# Patient Record
Sex: FEMALE | Race: WHITE | NOT HISPANIC OR LATINO | Employment: FULL TIME | URBAN - METROPOLITAN AREA
[De-identification: names, ages, dates, MRNs, and addresses within clinical notes are randomized per-mention and may not be internally consistent; named-entity substitution may affect disease eponyms.]

---

## 2019-11-04 ENCOUNTER — HOSPITAL ENCOUNTER (EMERGENCY)
Facility: HOSPITAL | Age: 19
Discharge: HOME | End: 2019-11-04
Attending: EMERGENCY MEDICINE | Admitting: EMERGENCY MEDICINE

## 2019-11-04 VITALS
OXYGEN SATURATION: 100 % | TEMPERATURE: 98.1 F | DIASTOLIC BLOOD PRESSURE: 99 MMHG | WEIGHT: 117.73 LBS | SYSTOLIC BLOOD PRESSURE: 139 MMHG | HEART RATE: 114 BPM | RESPIRATION RATE: 16 BRPM

## 2019-11-04 DIAGNOSIS — G51.0 BELL'S PALSY: Primary | ICD-10-CM

## 2019-11-04 PROCEDURE — 99284 EMERGENCY DEPT VISIT MOD MDM: CPT

## 2019-11-04 PROCEDURE — 99284 EMERGENCY DEPT VISIT MOD MDM: CPT | Performed by: EMERGENCY MEDICINE

## 2019-11-04 PROCEDURE — 86618 LYME DISEASE ANTIBODY: CPT | Performed by: EMERGENCY MEDICINE

## 2019-11-04 PROCEDURE — 36415 COLL VENOUS BLD VENIPUNCTURE: CPT | Performed by: EMERGENCY MEDICINE

## 2019-11-04 RX ORDER — ACYCLOVIR 200 MG/1
400 CAPSULE ORAL ONCE
Status: DISCONTINUED | OUTPATIENT
Start: 2019-11-04 | End: 2019-11-04

## 2019-11-04 RX ORDER — NORETHINDRONE ACETATE AND ETHINYL ESTRADIOL 1MG-20(21)
1 KIT ORAL DAILY
COMMUNITY
End: 2021-07-09

## 2019-11-04 RX ORDER — VALACYCLOVIR HYDROCHLORIDE 500 MG/1
1000 TABLET, FILM COATED ORAL ONCE
Status: COMPLETED | OUTPATIENT
Start: 2019-11-04 | End: 2019-11-04

## 2019-11-04 RX ORDER — PREDNISONE 20 MG/1
TABLET ORAL
Qty: 15 TABLET | Refills: 0 | Status: SHIPPED | OUTPATIENT
Start: 2019-11-04 | End: 2019-11-16

## 2019-11-04 RX ORDER — VALACYCLOVIR HYDROCHLORIDE 500 MG/1
1000 TABLET, FILM COATED ORAL EVERY 8 HOURS SCHEDULED
Status: DISCONTINUED | OUTPATIENT
Start: 2019-11-04 | End: 2019-11-04

## 2019-11-04 RX ORDER — PREDNISONE 20 MG/1
60 TABLET ORAL DAILY
Qty: 15 TABLET | Refills: 0 | Status: SHIPPED | OUTPATIENT
Start: 2019-11-04 | End: 2019-11-11

## 2019-11-04 RX ORDER — DEXTROAMPHETAMINE SACCHARATE, AMPHETAMINE ASPARTATE, DEXTROAMPHETAMINE SULFATE AND AMPHETAMINE SULFATE 3.75; 3.75; 3.75; 3.75 MG/1; MG/1; MG/1; MG/1
15 TABLET ORAL DAILY
COMMUNITY

## 2019-11-04 RX ORDER — DULOXETIN HYDROCHLORIDE 60 MG/1
60 CAPSULE, DELAYED RELEASE ORAL 2 TIMES DAILY
COMMUNITY
End: 2021-07-09

## 2019-11-04 RX ADMIN — VALACYCLOVIR HYDROCHLORIDE 1000 MG: 500 TABLET, FILM COATED ORAL at 17:05

## 2019-11-04 RX ADMIN — PREDNISONE 50 MG: 10 TABLET ORAL at 16:46

## 2019-11-04 NOTE — ED PROVIDER NOTES
History  Chief Complaint   Patient presents with    Facial Numbness     R sided facial numbness, droop since Saturday  Denies extremity involvement  Recent URI  No prior history of similar  Patient is a 76-year-old female who presents for right-sided facial numbness and droop  Patient says the symptoms started on Saturday  She says that since Wednesday she has had sinus congestion productive of yellowish mucus  She says that starting Saturday she noticed she had a weird sensation the right side of her face and difficulty raising her eyebrows on closing her eyes  Patient says she has never had this with sinus infections in the past   Patient says she had Lyme disease about 6 years ago but was treated with doxycycline at that time  She denies any new rashes, or spend any time in the woods recently  She denies ear pain, ear drainage, tinnitus, sore throat, neck pain, headache, numbness or tingling or weakness in her arms or legs  Prior to Admission Medications   Prescriptions Last Dose Informant Patient Reported? Taking? DULoxetine (CYMBALTA) 60 mg delayed release capsule 11/4/2019 at Unknown time Self Yes Yes   Sig: Take 60 mg by mouth 2 (two) times a day   amphetamine-dextroamphetamine (ADDERALL) 15 MG tablet 11/4/2019 at Unknown time Self Yes Yes   Sig: Take 15 mg by mouth 2 (two) times a day   norethindrone-ethinyl estradiol (JUNEL FE 1/20) 1-20 MG-MCG per tablet 11/4/2019 at Unknown time Self Yes Yes   Sig: Take 1 tablet by mouth daily      Facility-Administered Medications: None       Past Medical History:   Diagnosis Date    Asthma     Lyme disease        Past Surgical History:   Procedure Laterality Date    NASAL SINUS SURGERY      UPPER GASTROINTESTINAL ENDOSCOPY         History reviewed  No pertinent family history  I have reviewed and agree with the history as documented      Social History     Tobacco Use    Smoking status: Never Smoker    Smokeless tobacco: Never Used Substance Use Topics    Alcohol use: Yes     Comment: socially     Drug use: Never        Review of Systems   Constitutional: Negative for chills, diaphoresis and fever  HENT: Positive for congestion  Negative for sinus pressure, sore throat and trouble swallowing  Eyes: Negative for pain, discharge and itching  Respiratory: Negative for cough, chest tightness, shortness of breath and wheezing  Cardiovascular: Negative for chest pain, palpitations and leg swelling  Gastrointestinal: Negative for abdominal distention, abdominal pain, blood in stool, diarrhea, nausea and vomiting  Endocrine: Negative for polyphagia and polyuria  Genitourinary: Negative for difficulty urinating, dysuria, flank pain, hematuria, pelvic pain and vaginal bleeding  Musculoskeletal: Negative for arthralgias and back pain  Skin: Negative for rash  Neurological: Positive for facial asymmetry  Negative for dizziness, syncope, weakness, light-headedness and headaches  Physical Exam  ED Triage Vitals [11/04/19 1558]   Temperature Pulse Respirations Blood Pressure SpO2   98 1 °F (36 7 °C) (!) 114 16 139/99 100 %      Temp Source Heart Rate Source Patient Position - Orthostatic VS BP Location FiO2 (%)   Temporal Monitor Sitting Right arm --      Pain Score       No Pain             Orthostatic Vital Signs  Vitals:    11/04/19 1558   BP: 139/99   Pulse: (!) 114   Patient Position - Orthostatic VS: Sitting       Physical Exam   Constitutional: She is oriented to person, place, and time  She appears well-developed and well-nourished  No distress  HENT:   Head: Normocephalic and atraumatic  Right Ear: External ear normal    Left Ear: External ear normal    Mouth/Throat: Oropharynx is clear and moist  No oropharyngeal exudate  Eyes: Pupils are equal, round, and reactive to light  Conjunctivae and EOM are normal    Inability to completely close right eyelid  No conjunctival injection   Neck: Normal range of motion  Neck supple  Cardiovascular: Normal rate, regular rhythm, normal heart sounds and intact distal pulses  Exam reveals no gallop and no friction rub  No murmur heard  Pulmonary/Chest: Effort normal and breath sounds normal  No respiratory distress  She has no wheezes  She has no rales  Abdominal: Soft  She exhibits no distension  There is no tenderness  There is no guarding  Musculoskeletal: Normal range of motion  She exhibits no edema, tenderness or deformity  Lymphadenopathy:     She has no cervical adenopathy  Neurological: She is alert and oriented to person, place, and time  A cranial nerve deficit is present  No sensory deficit  She exhibits normal muscle tone  Inability to raise right eye viral and completely close right eyelid  Right sided mouth paralysis  Skin: Skin is warm and dry  Psychiatric: She has a normal mood and affect  Nursing note and vitals reviewed  ED Medications  Medications   predniSONE tablet 50 mg (50 mg Oral Given 11/4/19 1646)   valACYclovir (VALTREX) tablet 1,000 mg (1,000 mg Oral Given 11/4/19 1705)       Diagnostic Studies  Results Reviewed     Procedure Component Value Units Date/Time    Lyme Antibody Profile with reflex to [facility] [[id number]] Collected:  11/04/19 1651    Lab Status: In process Specimen:  Blood from Arm, Right Updated:  11/04/19 1654                 No orders to display         Procedures  Procedures        ED Course                               MDM  Number of Diagnoses or Management Options  Bell's palsy:   Diagnosis management comments: 69-year-old female presenting for right-sided facial paralysis  Symptoms started on Saturday  Has paralysis of right eyebrow, right eyelid, right mouth  No rashes noticed on face  TM clear on the right side  No neck pain  No fevers or chills  No other focal neurologic deficits  Believe patient's symptoms are secondary to Bell's palsy  Will send Lyme titer  Will treat with prednisone    Will give ENT follow-up  Told to use rewetting drops for her eyes  Disposition  Final diagnoses:   Bell's palsy     Time reflects when diagnosis was documented in both MDM as applicable and the Disposition within this note     Time User Action Codes Description Comment    11/4/2019  4:41 PM Megan Sweet Add [G51 0] Bell's palsy       ED Disposition     ED Disposition Condition Date/Time Comment    Discharge Stable Mon Nov 4, 2019  4:43 PM Teresa Iyer discharge to home/self care  Follow-up Information     Follow up With Specialties Details Why Sandra Boone MD Otolaryngology, Plastic Surgery Schedule an appointment as soon as possible for a visit  For follow up of symptoms [id number]            Patient's Medications   Discharge Prescriptions    PREDNISONE 20 MG TABLET    Take 2 tablets (40 mg total) by mouth daily for 3 days, THEN 1 5 tablets (30 mg total) daily for 3 days, THEN 1 tablet (20 mg total) daily for 3 days, THEN 0 5 tablets (10 mg total) daily for 3 days  Start Date: 11/4/2019 End Date: 11/16/2019       Order Dose: --       Quantity: 15 tablet    Refills: 0    PREDNISONE 20 MG TABLET    Take 3 tablets (60 mg total) by mouth daily for 7 days       Start Date: 11/4/2019 End Date: 11/11/2019       Order Dose: 60 mg       Quantity: 15 tablet    Refills: 0     No discharge procedures on file  ED Provider  Attending physically available and evaluated Teresa Iyer  HARITHA managed the patient along with the ED Attending      Electronically Signed by         Leann Singh DO  11/04/19 9910

## 2019-11-04 NOTE — ED ATTENDING ATTESTATION
11/4/2019  Frank Hylton MD, saw and evaluated the patient  I have discussed the patient with the resident/non-physician practitioner and agree with the resident's/non-physician practitioner's findings, Plan of Care, and MDM as documented in the resident's/non-physician practitioner's note, except where noted  All available labs and Radiology studies were reviewed  I was present for key portions of any procedure(s) performed by the resident/non-physician practitioner and I was immediately available to provide assistance  At this point I agree with the current assessment done in the Emergency Department  I have conducted an independent evaluation of this patient a history and physical is as follows:    C/o R sided facial numbness , difficulty moving her mouth since 2 days ago  No rash, no tinnitus, no headaches/neck pain, no fevers  No risk factors for CVA/TIA  Recently had some sinus congestion    Well-appearing, no distress, PERRL, RRR, CTA b/l, abd  -nontender, ext  - no edema, R face sensation intact, not able to smile on R side, cannot close R eye well, not able to scrunch R forehead    ED Course         Critical Care Time  Procedures

## 2019-11-05 LAB — B BURGDOR IGG+IGM SER-ACNC: <0.91 ISR (ref 0–0.9)

## 2021-07-09 ENCOUNTER — OFFICE VISIT (OUTPATIENT)
Dept: OBGYN CLINIC | Facility: CLINIC | Age: 21
End: 2021-07-09
Payer: COMMERCIAL

## 2021-07-09 VITALS
HEART RATE: 111 BPM | TEMPERATURE: 97.8 F | DIASTOLIC BLOOD PRESSURE: 88 MMHG | BODY MASS INDEX: 25.52 KG/M2 | WEIGHT: 130 LBS | HEIGHT: 60 IN | SYSTOLIC BLOOD PRESSURE: 120 MMHG

## 2021-07-09 DIAGNOSIS — Z12.4 CERVICAL CANCER SCREENING: ICD-10-CM

## 2021-07-09 DIAGNOSIS — Z71.85 HPV VACCINE COUNSELING: ICD-10-CM

## 2021-07-09 DIAGNOSIS — Z30.41 SURVEILLANCE OF CONTRACEPTIVE PILL: ICD-10-CM

## 2021-07-09 DIAGNOSIS — Z11.3 SCREEN FOR STD (SEXUALLY TRANSMITTED DISEASE): ICD-10-CM

## 2021-07-09 DIAGNOSIS — Z01.419 ENCOUNTER FOR ANNUAL ROUTINE GYNECOLOGICAL EXAMINATION: Primary | ICD-10-CM

## 2021-07-09 PROCEDURE — 99385 PREV VISIT NEW AGE 18-39: CPT | Performed by: CLINICAL NURSE SPECIALIST

## 2021-07-09 PROCEDURE — 87591 N.GONORRHOEAE DNA AMP PROB: CPT | Performed by: CLINICAL NURSE SPECIALIST

## 2021-07-09 PROCEDURE — G0145 SCR C/V CYTO,THINLAYER,RESCR: HCPCS | Performed by: CLINICAL NURSE SPECIALIST

## 2021-07-09 PROCEDURE — 87491 CHLMYD TRACH DNA AMP PROBE: CPT | Performed by: CLINICAL NURSE SPECIALIST

## 2021-07-09 PROCEDURE — 90471 IMMUNIZATION ADMIN: CPT

## 2021-07-09 PROCEDURE — 90651 9VHPV VACCINE 2/3 DOSE IM: CPT

## 2021-07-09 RX ORDER — NORETHINDRONE ACETATE AND ETHINYL ESTRADIOL 1.5-30(21)
1 KIT ORAL DAILY
COMMUNITY
End: 2021-07-15

## 2021-07-09 RX ORDER — EPINEPHRINE 0.3 MG/.3ML
INJECTION SUBCUTANEOUS
COMMUNITY

## 2021-07-09 RX ORDER — DULOXETIN HYDROCHLORIDE 60 MG/1
30 CAPSULE, DELAYED RELEASE ORAL DAILY
COMMUNITY

## 2021-07-09 NOTE — PROGRESS NOTES
Subjective:        Klaudia Woodard is a 24 y o  female  Here for New Patient Visit and Gynecologic Exam (yearly exam and discuss birth control - pt currently on OCP but is concidering IUD)      GYN HPI  Here for annual gyn exam  Regarding menstrual cycle: Patient denies menstrual complaints  Regular monthly menses, not excessive  She denies any abnormal vaginal or urinary complaints    Denies changes or concerns r/t breasts  She does performs self breast exams  She reports she generally eats a healthy diet and does not exercise regularly  She does get dietary calcium/vit D  She denies any untreated or poorly controlled anxiety or depression sxs  She is sexually active  She is presently on OCP  Denies ACHES or other adverse effect  Considering Mirena IUD She does use condoms for STI prevention  , She denies issues with intimacy  She reports that she does feel safe at home  The following portions of the patient's history were reviewed and updated as appropriate: allergies, current medications, past family history, past medical history, past social history, past surgical history, and problem list       Hereditary Cancer Screening  Family history reviewed and patient does not meet criteria for referral for genetic cancer assessment  Substance Abuse Screening Completed  See hx and flowsheet      Screens Positive for none         HEALTH MAINTENANCE SCREENINGS:    History of abnormal pap: No, Last Papanicolaou test:  Not on file      IMMUNIZATIONS  Tetanus vaccination status reviewed: tetanus status unknown to the patient  Gardasil HPV vaccine was not completed    Review of Systems   Constitutional: Negative for appetite change, chills, fatigue, fever and unexpected weight change  HENT: Negative  Eyes: Negative  Respiratory: Negative for chest tightness and shortness of breath  Cardiovascular: Negative for chest pain and palpitations     Gastrointestinal: Negative for abdominal pain, constipation and vomiting  Endocrine: Negative for cold intolerance and heat intolerance  Genitourinary:        As per HPI   Musculoskeletal: Negative for back pain, joint swelling and neck pain  Skin: Negative for color change and rash  Neurological: Negative for dizziness, weakness and numbness  Hematological: Does not bruise/bleed easily  Psychiatric/Behavioral: Negative  Objective:  /88 (BP Location: Right arm, Patient Position: Sitting, Cuff Size: Adult)   Pulse (!) 111   Temp 97 8 °F (36 6 °C) (Temporal)   Ht 5' (1 524 m)   Wt 59 kg (130 lb)   LMP 07/05/2021 (Exact Date)   BMI 25 39 kg/m²        Physical Exam  Constitutional:       General: She is not in acute distress  Appearance: Normal appearance  Genitourinary:      Pelvic exam was performed with patient in the lithotomy position  Vulva, urethra and rectum normal       No vulval lesion, tenderness or rash noted  No posterior fourchette injury or lesion present  No urethral prolapse or caruncle present  No lesions in the vagina  No vaginal discharge, erythema, tenderness, bleeding or prolapse  No cervical motion tenderness, discharge, friability or erythema  Uterus is not enlarged or tender  Uterus is regular  No right or left adnexal mass present  Right adnexa not tender  Left adnexa not tender  HENT:      Head: Normocephalic and atraumatic  Cardiovascular:      Rate and Rhythm: Normal rate  Heart sounds: No murmur heard  Pulmonary:      Effort: Pulmonary effort is normal       Breath sounds: Normal breath sounds  Chest:      Breasts: Breasts are symmetrical          Right: No inverted nipple, mass, nipple discharge, skin change or tenderness  Left: No inverted nipple, mass, nipple discharge, skin change or tenderness  Abdominal:      General: There is no distension  Palpations: Abdomen is soft  Tenderness:  There is no abdominal tenderness  Musculoskeletal:         General: Normal range of motion  Cervical back: Normal range of motion  Lymphadenopathy:      Cervical: No cervical adenopathy  Neurological:      Mental Status: She is alert and oriented to person, place, and time  Skin:     General: Skin is warm and dry  Psychiatric:         Mood and Affect: Mood normal          Behavior: Behavior normal    Vitals reviewed  Assessment/Plan:           Rosaura Hill- Primary  Annual GYN examination completed today  Risk prevention and anticipatory guidance provided including:   Pap/breast screenings- see below   Risk for hereditary cancers: Family history reviewed and patient does not qualify for referral for genetics consult/testing  Referral to genetics oncology offered as indicated   Calcium and vitamin D supplementation   Risk factors for lipid, diabetes and thryroid screening, ordered if needed   Dietary and lifestyle recommendations based on her age and weight  body mass index is 25 39 kg/m²  Madison Ceja PHQ-2 depression screen completed  Reviewed and interventions recommended if needed   Tobacco and alcohol use, intervention ordered if applicable  Cervical cancer screening  Previous pap smears and ASCCP screening guidelines have been reviewed  Pap smear is indicated at this time  STI Screening  STI screening is desired   STI prevention discussed with use of condoms  Breast exam and breast cancer screening  Breast exam was done; breast cancer imaging/screening is not indicated at this time due to age    Problem List Items Addressed This Visit     HPV vaccine counseling     Pt has not yet had the gardasil vaccine  Reviewed the following education points  Series of 3 injections over the course of 6 months (now, 1 and 6 months)   Primary purpose is the prevention of 9 high risk strains of HPV which are associated with cervical cancer as well as genital warts    Will protect against future exposure but cannot reverse exposure that has already occurred  She does agree to start the series today           Relevant Orders    HPV Vaccine 9 valent IM    Surveillance of contraceptive pill     Patient currently on Microgestin at the 1 5-30  She is doing well on the does not have any general complaints but is considering doing IUD  Would like Jerri Daley IUD  Discussed with patient that she is a good candidate with low risk for the IUD in view that she is in a monogamous long term relationship  She verbalized understanding of the risks including risk of pregnancy approximately 1%, risk of ectopic pregnancy, irregular/absent/painful menses, risks of infection, expulsion risk of 2-10% in 1st year, perforation through the uterus, migration and need for surgery, among others  Patient also understands that the IUD does not protect against STDs and she should continue consistent use of condoms for this purpose  Again reviewed common side effects particularly irregular bleeding the first 3-6 months that typically lessens with time  Reviewed timing of insertion and to call with onset of next menses  Will check benefits                  Other Visit Diagnoses     Encounter for annual routine gynecological examination    -  Primary    Relevant Orders    Chlamydia/GC amplified DNA by PCR    Liquid-based pap, screening    Cervical cancer screening        Relevant Orders    Liquid-based pap, screening    Screen for STD (sexually transmitted disease)        Relevant Orders    Chlamydia/GC amplified DNA by PCR          Orders Placed This Encounter   Procedures    Chlamydia/GC amplified DNA by PCR    HPV Vaccine 9 valent IM

## 2021-07-09 NOTE — ASSESSMENT & PLAN NOTE
Pt has not yet had the gardasil vaccine  Reviewed the following education points  Series of 3 injections over the course of 6 months (now, 1 and 6 months)   Primary purpose is the prevention of 9 high risk strains of HPV which are associated with cervical cancer as well as genital warts    Will protect against future exposure but cannot reverse exposure that has already occurred  She does agree to start the series today

## 2021-07-09 NOTE — ASSESSMENT & PLAN NOTE
Patient currently on Microgestin at the 1 5-30  She is doing well on the does not have any general complaints but is considering doing IUD  Would like Alysha IUD  Discussed with patient that she is a good candidate with low risk for the IUD in view that she is in a monogamous long term relationship  She verbalized understanding of the risks including risk of pregnancy approximately 1%, risk of ectopic pregnancy, irregular/absent/painful menses, risks of infection, expulsion risk of 2-10% in 1st year, perforation through the uterus, migration and need for surgery, among others  Patient also understands that the IUD does not protect against STDs and she should continue consistent use of condoms for this purpose  Again reviewed common side effects particularly irregular bleeding the first 3-6 months that typically lessens with time  Reviewed timing of insertion and to call with onset of next menses  Will check benefits

## 2021-07-09 NOTE — PATIENT INSTRUCTIONS
Iud pre-instruction  · We are going to check your insurance benefits to verify whether or not your insurance will cover the [facility] IUD  · Take 600mg motrin (or similar over the counter pain medication) 1 Hour before appointment        HPV (Human Papillomavirus)   WHAT YOU NEED TO KNOW:   What is human papillomavirus (HPV)? HPV is the most common infection spread by sexual contact  It can also be spread from a mother to her baby during delivery  HPV may cause oral and genital warts or tumors in your nose, mouth, throat, and lungs  HPV may also cause vaginal, penile, and anal cancers  You may not show symptoms of any of these conditions for several years after being exposed to HPV  What are the symptoms of HPV?  Painless warts    Genital or anal discharge, bleeding, itching, or pain     Pain when you urinate  How is HPV diagnosed? Your healthcare provider may use a vinegar liquid to help diagnose HPV genital warts  Women 27to 72years old can be checked for HPV during regular cervical cancer screenings  An HPV test checks for certain types of HPV that can cause changes in cervical cells  Without treatment, the changed cells can become cancer  An HPV test can be done every 5 years if the results show no infection  The test can be done with or without a Pap smear  A Pap smear checks for cancer or for abnormal cells that can become cancer  You may be tested for HPV if you are diagnosed with a mouth or throat cancer  How is HPV treated? HPV cannot be cured  Conditions that are caused by HPV can be treated  You will need to be monitored closely for these conditions  Ask your healthcare provider for more information about monitoring, conditions caused by HPV, and available treatments  How can HPV infection be prevented? · Ask about the HPV vaccine  The vaccine can help protect against HPV infection  Females and males can receive the vaccine  It is most effective if given before sexual activity begins   This allows the body to build almost complete protection against HPV before contact with the virus  The vaccine is usually given at 6or 15years of age but may be given as early as 5 years  The vaccine can be given through age 39  · Always use a condom during intercourse  A condom will not completely protect you from HPV infection, but it will help lower your risk  Use a new condom or latex barrier each time you have sex  This includes oral, vaginal, and anal sex  Make sure the condom fits and is put on correctly  Rubber latex sheets or dental dams can be used for oral sex  If you are allergic to latex, use a nonlatex product such as polyurethane  When should I call my doctor? · You have warts in your genital or anal area  · You have genital or anal discharge, bleeding, itching, or pain  · You have pain when you urinate  · You have questions or concerns about your condition or care  CARE AGREEMENT:   You have the right to help plan your care  Learn about your health condition and how it may be treated  Discuss treatment options with your healthcare providers to decide what care you want to receive  You always have the right to refuse treatment  The above information is an  only  It is not intended as medical advice for individual conditions or treatments  Talk to your doctor, nurse or pharmacist before following any medical regimen to see if it is safe and effective for you  © Copyright [address] Information is for End User's use only and may not be sold, redistributed or otherwise used for commercial purposes   All illustrations and images included in CareNotes® are the copyrighted property of A D A M , Inc  or [address] [place] [address]

## 2021-07-11 LAB
C TRACH DNA SPEC QL NAA+PROBE: NEGATIVE
N GONORRHOEA DNA SPEC QL NAA+PROBE: NEGATIVE

## 2021-07-12 ENCOUNTER — TELEPHONE (OUTPATIENT)
Dept: OBGYN CLINIC | Facility: CLINIC | Age: 21
End: 2021-07-12

## 2021-07-12 NOTE — TELEPHONE ENCOUNTER
Called patients insurance at [phone] spoke to: Dion Nolasco, states the [place], insertion, and removal are all covered at 100% with NO AUTH needed   REF# [id number]

## 2021-07-15 ENCOUNTER — PROCEDURE VISIT (OUTPATIENT)
Dept: OBGYN CLINIC | Facility: CLINIC | Age: 21
End: 2021-07-15
Payer: COMMERCIAL

## 2021-07-15 VITALS
DIASTOLIC BLOOD PRESSURE: 82 MMHG | HEART RATE: 95 BPM | BODY MASS INDEX: 26.11 KG/M2 | WEIGHT: 133 LBS | SYSTOLIC BLOOD PRESSURE: 118 MMHG | HEIGHT: 60 IN | TEMPERATURE: 97.8 F

## 2021-07-15 DIAGNOSIS — Z30.430 ENCOUNTER FOR IUD INSERTION: Primary | ICD-10-CM

## 2021-07-15 PROBLEM — Z30.41 SURVEILLANCE OF CONTRACEPTIVE PILL: Status: RESOLVED | Noted: 2021-07-09 | Resolved: 2021-07-15

## 2021-07-15 PROBLEM — Z30.431 SURVEILLANCE FOR BIRTH CONTROL, INTRAUTERINE DEVICE: Status: ACTIVE | Noted: 2021-07-15

## 2021-07-15 LAB — SL AMB POCT URINE HCG: NEGATIVE

## 2021-07-15 PROCEDURE — 58300 INSERT INTRAUTERINE DEVICE: CPT | Performed by: CLINICAL NURSE SPECIALIST

## 2021-07-15 PROCEDURE — 81025 URINE PREGNANCY TEST: CPT | Performed by: CLINICAL NURSE SPECIALIST

## 2021-07-15 NOTE — PROGRESS NOTES
[address] [place] [address]  LMP:  7/5/2021    Iud insertions    Date/Time: 7/15/2021 2:38 PM  Performed by: ZOEY Jimenes  Authorized by: ZOEY Jimenes   Universal Protocol:  Consent: Written consent obtained  Risks and benefits: risks, benefits and alternatives were discussed  Consent given by: patient  Time out: Immediately prior to procedure a "time out" was called to verify the correct patient, procedure, equipment, support staff and site/side marked as required  Timeout called at: 7/15/2021 1:45 AM   Patient understanding: patient states understanding of the procedure being performed  Patient consent: the patient's understanding of the procedure matches consent given  Procedure consent: procedure consent matches procedure scheduled  Patient identity confirmed: verbally with patient        Procedure:     Pelvic exam performed: yes      Negative GC/chlamydia test: yes      Negative urine pregnancy test: yes      Cervix cleaned and prepped: yes      Speculum placed in vagina: yes      Tenaculum applied to cervix: no (Allis applied to cervix)      Uterus sound depth (cm):  7    IUD inserted with no complications: yes      Strings trimmed: yes (3 cm)    Post-procedure:     Patient will follow up after next period: yes    Comments:          Condition:  Stable    Complications:  None    Plan:  Post procedure instructions were given  She was advised nothing per vagina x 3 day  The patient was advised to call for any fever or for prolonged or severe pain or bleeding  She was advised to use OTC analgesics as needed for mild to moderate pain

## 2021-07-16 LAB
LAB AP GYN PRIMARY INTERPRETATION: NORMAL
Lab: NORMAL

## 2021-08-10 ENCOUNTER — TELEPHONE (OUTPATIENT)
Dept: OBGYN CLINIC | Facility: CLINIC | Age: 21
End: 2021-08-10

## 2021-08-10 NOTE — TELEPHONE ENCOUNTER
Had Kyleena x 1 month  Has been having for 2 weeks, pretty bad cramping on the left    Advised appt tomorrow at 10 am for IUD check, will order US to check IUD position

## 2021-08-10 NOTE — TELEPHONE ENCOUNTER
Pt had Kyleena inserted on 7/15/2021  She has concerns about her period she has had it for 14 days  She is having cramping and pain on one side  The blood is no longer red it has now turned to brown  Please advise

## 2021-08-11 ENCOUNTER — TELEPHONE (OUTPATIENT)
Dept: OBGYN CLINIC | Facility: CLINIC | Age: 21
End: 2021-08-11

## 2021-08-11 ENCOUNTER — OFFICE VISIT (OUTPATIENT)
Dept: OBGYN CLINIC | Facility: CLINIC | Age: 21
End: 2021-08-11
Payer: COMMERCIAL

## 2021-08-11 ENCOUNTER — HOSPITAL ENCOUNTER (OUTPATIENT)
Dept: RADIOLOGY | Age: 21
Discharge: HOME | End: 2021-08-11
Payer: COMMERCIAL

## 2021-08-11 VITALS
HEART RATE: 133 BPM | TEMPERATURE: 97.8 F | WEIGHT: 136.4 LBS | SYSTOLIC BLOOD PRESSURE: 118 MMHG | DIASTOLIC BLOOD PRESSURE: 60 MMHG | BODY MASS INDEX: 26.78 KG/M2 | HEIGHT: 60 IN

## 2021-08-11 DIAGNOSIS — T83.84XA PAIN DUE TO INTRAUTERINE CONTRACEPTIVE DEVICE (IUD), INITIAL ENCOUNTER (HCC): ICD-10-CM

## 2021-08-11 DIAGNOSIS — N92.1 BREAKTHROUGH BLEEDING ASSOCIATED WITH INTRAUTERINE DEVICE (IUD): Primary | ICD-10-CM

## 2021-08-11 DIAGNOSIS — N92.1 BREAKTHROUGH BLEEDING ASSOCIATED WITH INTRAUTERINE DEVICE (IUD): ICD-10-CM

## 2021-08-11 DIAGNOSIS — Z97.5 BREAKTHROUGH BLEEDING ASSOCIATED WITH INTRAUTERINE DEVICE (IUD): ICD-10-CM

## 2021-08-11 DIAGNOSIS — Z97.5 BREAKTHROUGH BLEEDING ASSOCIATED WITH INTRAUTERINE DEVICE (IUD): Primary | ICD-10-CM

## 2021-08-11 PROCEDURE — 76830 TRANSVAGINAL US NON-OB: CPT

## 2021-08-11 PROCEDURE — 99214 OFFICE O/P EST MOD 30 MIN: CPT | Performed by: OBSTETRICS & GYNECOLOGY

## 2021-08-11 PROCEDURE — 76856 US EXAM PELVIC COMPLETE: CPT

## 2021-08-11 RX ORDER — IBUPROFEN 600 MG/1
600 TABLET ORAL EVERY 6 HOURS SCHEDULED
Qty: 20 TABLET | Refills: 0 | Status: SHIPPED | OUTPATIENT
Start: 2021-08-11 | End: 2021-11-12 | Stop reason: ALTCHOICE

## 2021-08-11 NOTE — TELEPHONE ENCOUNTER
Called patient to let her now that her Intrauterine device is in appropriate location  Patient was advised ibuprofen 600 mg every 6 hours for 5 days    She was advised to follow-up in 3 months as previously discussed, sooner if her symptoms worsen or persist or she  Is requesting Intrauterine device removal

## 2021-08-11 NOTE — PROGRESS NOTES
Assessment/Plan:     Diagnoses and all orders for this visit:    Breakthrough bleeding associated with intrauterine device (IUD)  -     ibuprofen (MOTRIN) 600 mg tablet; Take 1 tablet (600 mg total) by mouth every 6 (six) hours for 5 days  -     US pelvis complete w transvaginal; Future    Pain due to intrauterine contraceptive device (IUD), initial encounter ([facility] )  -     US pelvis complete w transvaginal; Future          Patient with pain and bleeding post IUD insertion  Irregular bleeding and cramping could occur within the 1st few months after Intrauterine device placement  Usually, by 6 months following insertion, most patients would report resolution of the symptoms and improvement in bleeding patterns  Patient's with continued bleeding after this period of time may need evaluation to rule malposition, expulsion, pregnancy, infection, and cervical dysplasia   Treatment options may include NSAIDs,  or addition of OCP  Medical treatment can be offered to women who continue to have bothersome bleeding up to 3 months from IUD insertion,  Ibuprofen, 600 mg every 6 hours for 5 days may be used  Another alternative is to use low-dose combined hormonal contraceptive pill  Advised IBUPROFEN 600 mg q6 x 5 days and follow-up in 3 months, sooner if needed  STAT pelvic US ordered to check IUD placement  Will call pt with US results  Subjective   Patient ID: Alexandra Kerr is a 24 y o  female  Patient is here for a problem visit  Chief Complaint   Patient presents with    Gynecologic Exam     IUD string check bleeding 14 days     Patient here for IUD problem  She has been bleeding for the past 2 weeks and is having pelvic pain  She has been having  pain and bleeding for 2 weeks, pretty bad cramping on the left  She is sexually active, has no pain with intercourse, has been with same partner  She had STD testing 21 GC/CT negative      Menstrual History:  OB History        0    Para 0    Term   0       0    AB   0    Living   0       SAB   0    TAB   0    Ectopic   0    Multiple   0    Live Births   0                  Patient's last menstrual period was 2021  Past Medical History:   Diagnosis Date    Asthma     Lyme disease        Past Surgical History:   Procedure Laterality Date    LASIK  2021    NASAL SINUS SURGERY      TONSILLECTOMY  2001    UPPER GASTROINTESTINAL ENDOSCOPY         Social History     Tobacco Use    Smoking status: Never Smoker    Smokeless tobacco: Never Used   Vaping Use    Vaping Use: Never used   Substance Use Topics    Alcohol use: Yes     Comment: socially     Drug use: Never        Allergies   Allergen Reactions    Clindamycin/Lincomycin Hives     hives and flushed    Latex          Current Outpatient Medications:     amphetamine-dextroamphetamine (ADDERALL) 15 MG tablet, Take 15 mg by mouth daily , Disp: , Rfl:     DULoxetine (CYMBALTA) 60 mg delayed release capsule, Take 30 mg by mouth daily , Disp: , Rfl:     EPINEPHrine (EPIPEN) 0 3 mg/0 3 mL SOAJ, epinephrine 0 3 mg/0 3 mL injection, auto-injector  use as directed by prescriber, Disp: , Rfl:     ibuprofen (MOTRIN) 600 mg tablet, Take 1 tablet (600 mg total) by mouth every 6 (six) hours for 5 days, Disp: 20 tablet, Rfl: 0      Review of Systems   Constitutional: Negative  HENT: Negative  Eyes: Negative  Respiratory: Negative  Cardiovascular: Negative  Gastrointestinal: Negative  Endocrine: Negative  Genitourinary:        As noted in HPI   Musculoskeletal: Negative  Skin: Negative  Allergic/Immunologic: Negative  Neurological: Negative  Hematological: Negative  Psychiatric/Behavioral: Negative  /60   Pulse (!) 133   Temp 97 8 °F (36 6 °C) (Temporal)   Ht 5' (1 524 m)   Wt 61 9 kg (136 lb 6 4 oz)   LMP 2021   BMI 26 64 kg/m²       Physical Exam  Constitutional:       General: She is not in acute distress  Appearance: She is well-developed  Genitourinary:      Pelvic exam was performed with patient in the lithotomy position  Inguinal canal, urethra, bladder, cervix, uterus, right adnexa and left adnexa normal       No vulval lesion, tenderness, ulcerations, Bartholin's cyst or rash noted  No signs of labial injury  No posterior fourchette tenderness, injury, rash or lesion present  No signs of injury or lesions in the vagina  Vaginal discharge present  No vaginal erythema, tenderness, bleeding, atrophy or prolapse  No cervical polyp  IUD strings visualized  Uterus is not enlarged or tender  No uterine mass detected  Uterus is regular  No right or left adnexal mass present  Right adnexa not tender or full  Left adnexa not tender or full  Genitourinary Comments: Brownish discharge   Cardiovascular:      Rate and Rhythm: Normal rate  Pulmonary:      Effort: Pulmonary effort is normal    Abdominal:      General: There is no distension  Palpations: Abdomen is soft  Tenderness: There is no abdominal tenderness  Neurological:      Mental Status: She is alert and oriented to person, place, and time  Skin:     General: Skin is warm and dry     Psychiatric:         Behavior: Behavior normal

## 2021-08-11 NOTE — PATIENT INSTRUCTIONS
Intrauterine Device   DISCHARGE INSTRUCTIONS:   Seek care immediately if:   · You have severe pain or bleeding during your period  · You have a fever and severe abdominal pain  Call your doctor or gynecologist if:   · You think you are pregnant  · The IUD has come out  · You have bleeding from your vagina after you have sex, and it is not your period  · You have pain during sex  · You cannot feel the IUD string, the string feels longer, or you feel the plastic of the IUD itself  · You have vaginal discharge that is green, yellow, or has a foul odor  · You have questions or concerns about your condition or care  Medicines:   · NSAIDs  help decrease swelling and pain or fever  This medicine is available with or without a doctor's order  NSAIDs can cause stomach bleeding or kidney problems in certain people  If you take blood thinner medicine, always ask your healthcare provider if NSAIDs are safe for you  Always read the medicine label and follow directions  · Take your medicine as directed  Contact your healthcare provider if you think your medicine is not helping or if you have side effects  Tell him or her if you are allergic to any medicine  Keep a list of the medicines, vitamins, and herbs you take  Include the amounts, and when and why you take them  Bring the list or the pill bottles to follow-up visits  Carry your medicine list with you in case of an emergency  Self-care:   · Apply heat to relieve pain and cramping  Use a heating pad set on low  Apply heat to your lower abdomen for 20 minutes every hour, or as directed  · Return to activities as directed  Your healthcare provider will tell you when it is okay to return to work, school, or other activities  · Do not use a tampon or have sex  until your provider says it is okay  Make sure your IUD is in place: An IUD has a string that is made of plastic thread  One to 2 inches of this string hangs into your vagina  You cannot see this string, and it should not cause problems when you have sex  Check your IUD string every 3 days for the first 3 months that you have your IUD  After that, check the string after each monthly period  Do the following to check the placement of your IUD:  · Wash your hands with soap and warm water  Dry them with a clean towel  · Bend your knees and squat low to the ground  · Gently put your index finger inside your vagina  The cervix is at the top of the vagina and feels like the tip of your nose  Feel for the IUD string  Do not pull on the string  You should not be able to feel the firm plastic of the IUD itself  · Wash your hands after you check your IUD string  For more information:   · [facility] [address] , [address] [place]  Phone: 7- 515 - [phone]  Web Address: https://[employer]    Follow up with your healthcare provider as directed:  Write down your questions so you remember to ask them during your visits  © Copyright [employer] 2021 Information is for End User's use only and may not be sold, redistributed or otherwise used for commercial purposes  All illustrations and images included in CareNotes® are the copyrighted property of A D A M , Inc  or [facility] Amy Clarke   The above information is an  only  It is not intended as medical advice for individual conditions or treatments  Talk to your doctor, nurse or pharmacist before following any medical regimen to see if it is safe and effective for you

## 2021-09-10 ENCOUNTER — CLINICAL SUPPORT (OUTPATIENT)
Dept: OBGYN CLINIC | Facility: CLINIC | Age: 21
End: 2021-09-10
Payer: COMMERCIAL

## 2021-09-10 VITALS
TEMPERATURE: 98.4 F | DIASTOLIC BLOOD PRESSURE: 68 MMHG | HEIGHT: 60 IN | HEART RATE: 100 BPM | BODY MASS INDEX: 26.5 KG/M2 | WEIGHT: 135 LBS | SYSTOLIC BLOOD PRESSURE: 110 MMHG

## 2021-09-10 DIAGNOSIS — Z23 NEED FOR HPV VACCINE: Primary | ICD-10-CM

## 2021-09-10 PROCEDURE — 90471 IMMUNIZATION ADMIN: CPT

## 2021-09-10 PROCEDURE — 90651 9VHPV VACCINE 2/3 DOSE IM: CPT

## 2021-09-10 NOTE — PROGRESS NOTES
Patient presents to office for second Gardasil injection  Patient received injection in right deltoid and tolerated well

## 2021-11-12 ENCOUNTER — OFFICE VISIT (OUTPATIENT)
Dept: OBGYN CLINIC | Facility: CLINIC | Age: 21
End: 2021-11-12
Payer: COMMERCIAL

## 2021-11-12 VITALS
DIASTOLIC BLOOD PRESSURE: 78 MMHG | WEIGHT: 135.6 LBS | BODY MASS INDEX: 26.62 KG/M2 | HEIGHT: 60 IN | HEART RATE: 98 BPM | SYSTOLIC BLOOD PRESSURE: 122 MMHG | TEMPERATURE: 97.9 F

## 2021-11-12 DIAGNOSIS — Z30.431 SURVEILLANCE FOR BIRTH CONTROL, INTRAUTERINE DEVICE: Primary | ICD-10-CM

## 2021-11-12 PROCEDURE — 99213 OFFICE O/P EST LOW 20 MIN: CPT | Performed by: OBSTETRICS & GYNECOLOGY

## 2021-11-12 RX ORDER — DULOXETIN HYDROCHLORIDE 30 MG/1
30 CAPSULE, DELAYED RELEASE ORAL DAILY
COMMUNITY
Start: 2021-09-02

## 2022-07-20 NOTE — PATIENT INSTRUCTIONS
July 20, 2022      [facility]  [address], [address]  [place] [address]  Phone: [phone]  Fax: [phone]       Patient: Clemente Maloney   YOB: 1972  Date of Visit: 07/20/2022   1:00pm    To Whom It May Concern:    Tri Maloney  was at [facility] on 07/20/2022. The patient was tested today for COVID and he tested NEGATIVE for COVID. If you have any questions or concerns, or if I can be of further assistance, please do not hesitate to contact me.    Sincerely,    Kirti Womack MA      POST IUD INSERTION INSTRUCTIONS  · Due to IUD insertion you can expect some light vaginal bleeding  · Take motrin/ibuprofen as needed for cramping  · Nothing in the vagina for the next 2-3 days    · Try to feel for your strings monthly using 1-2 fingers inserted into vagina  · Call if you increasing pain, fever  100 5 or greater, or heavy bleeding  · Return to the office in 4-6 weeks for a recheck    IUd is good until 7/2026

## 2022-08-05 ENCOUNTER — OFFICE VISIT (OUTPATIENT)
Dept: URGENT CARE | Age: 22
End: 2022-08-05
Payer: COMMERCIAL

## 2022-08-05 VITALS
DIASTOLIC BLOOD PRESSURE: 96 MMHG | HEIGHT: 60 IN | BODY MASS INDEX: 28.98 KG/M2 | SYSTOLIC BLOOD PRESSURE: 139 MMHG | WEIGHT: 147.6 LBS | TEMPERATURE: 97.6 F | HEART RATE: 102 BPM | OXYGEN SATURATION: 97 % | RESPIRATION RATE: 18 BRPM

## 2022-08-05 DIAGNOSIS — J32.9 FREQUENT SINUS INFECTIONS: ICD-10-CM

## 2022-08-05 DIAGNOSIS — J01.01 ACUTE RECURRENT MAXILLARY SINUSITIS: Primary | ICD-10-CM

## 2022-08-05 DIAGNOSIS — H69.92 DISORDER OF LEFT EUSTACHIAN TUBE: ICD-10-CM

## 2022-08-05 PROCEDURE — 99213 OFFICE O/P EST LOW 20 MIN: CPT | Performed by: STUDENT IN AN ORGANIZED HEALTH CARE EDUCATION/TRAINING PROGRAM

## 2022-08-05 RX ORDER — CETIRIZINE HYDROCHLORIDE 10 MG/1
10 TABLET ORAL AS NEEDED
COMMUNITY

## 2022-08-05 RX ORDER — FLUTICASONE PROPIONATE 50 MCG
1 SPRAY, SUSPENSION (ML) NASAL DAILY PRN
COMMUNITY

## 2022-08-05 RX ORDER — PREDNISONE 10 MG/1
TABLET ORAL
Qty: 14 TABLET | Refills: 0 | Status: SHIPPED | OUTPATIENT
Start: 2022-08-05 | End: 2022-08-11

## 2022-08-05 RX ORDER — AMOXICILLIN AND CLAVULANATE POTASSIUM 875; 125 MG/1; MG/1
1 TABLET, FILM COATED ORAL EVERY 12 HOURS SCHEDULED
Qty: 20 TABLET | Refills: 0 | Status: SHIPPED | OUTPATIENT
Start: 2022-08-05 | End: 2022-08-15

## 2022-08-05 NOTE — PROGRESS NOTES
[facility]        NAME: Giuliano Bhakta is a 20 y o  female  : 2000    MRN: [id number]  DATE: 2022  TIME: 7:40 PM    Assessment and Plan   Acute recurrent maxillary sinusitis [J01 01]  1  Acute recurrent maxillary sinusitis  amoxicillin-clavulanate (AUGMENTIN) 875-125 mg per tablet    Ambulatory Referral to Otolaryngology   2  Frequent sinus infections  Ambulatory Referral to Otolaryngology   3  Disorder of left eustachian tube  predniSONE 10 mg tablet    Ambulatory Referral to Otolaryngology     Given the localized nature and severity of her pain, I believe she has an acute infection in her left maxillary sinus  I encouraged her to follow-up with ENT as this is the 2nd time she has needed antibiotics in the past few months  I have also prescribed a short course of prednisone to help with her eustachian tube dysfunction which is likely secondary to sinus congestion and swelling  Patient Instructions     Referral for ENT given today  Follow up with PCP in 3-5 days  Proceed to  ER if symptoms worsen  Chief Complaint     Chief Complaint   Patient presents with    Sinus and nasal pressure     Pt presents for left side sinus, ear and nasal pressure  Pt states congested on left side also  Pt states had similar Sx one month ago, subsided, but has started again about two days ago  Taking OTC pseudoephedrine, zyrtec and flonase  History of Present Illness     Patient is a 69-year-old female here for left-sided maxillary sinus pain and ear pressure  Patient notes that she has had symptoms the past 2-3 days  She had symptoms a few weeks ago which resolved but have now returned  She states that last month she was treated with a course of antibiotics  She has a past medical history significant for multiple sinus infections and has needed a sinus scraping in the past   She has not followed up with an ENT recently  Patient has associated ear popping and ear fullness    She denies fevers and sore throat  Review of Systems   Review of Systems   As noted in HPI    Current Medications       Current Outpatient Medications:     amoxicillin-clavulanate (AUGMENTIN) 875-125 mg per tablet, Take 1 tablet by mouth every 12 (twelve) hours for 10 days, Disp: 20 tablet, Rfl: 0    amphetamine-dextroamphetamine (ADDERALL) 15 MG tablet, Take 15 mg by mouth daily , Disp: , Rfl:     cetirizine (ZyrTEC) 10 mg tablet, Take 10 mg by mouth as needed for allergies, Disp: , Rfl:     DULoxetine (CYMBALTA) 30 mg delayed release capsule, Take 30 mg by mouth daily, Disp: , Rfl:     fluticasone (FLONASE) 50 mcg/act nasal spray, 1 spray into each nostril daily as needed for rhinitis, Disp: , Rfl:     predniSONE 10 mg tablet, Take 4 tablets (40 mg total) by mouth daily for 2 days, THEN 2 tablets (20 mg total) daily for 2 days, THEN 1 tablet (10 mg total) daily for 2 days  , Disp: 14 tablet, Rfl: 0    DULoxetine (CYMBALTA) 60 mg delayed release capsule, Take 30 mg by mouth daily  (Patient not taking: Reported on 11/12/2021 ), Disp: , Rfl:     EPINEPHrine (EPIPEN) 0 3 mg/0 3 mL SOAJ, epinephrine 0 3 mg/0 3 mL injection, auto-injector  use as directed by prescriber (Patient not taking: No sig reported), Disp: , Rfl:     levonorgestrel (Eula ) 19 5 MG intrauterine device, 1 Intra Uterine Device by Intrauterine route once, Disp: , Rfl:     Current Allergies     Allergies as of 08/05/2022 - Reviewed 11/12/2021   Allergen Reaction Noted    Clindamycin/lincomycin Hives 04/16/2002    Latex  11/04/2019            The following portions of the patient's history were reviewed and updated as appropriate: allergies, current medications, past family history, past medical history, past social history, past surgical history and problem list      Past Medical History:   Diagnosis Date    Asthma     Lyme disease        Past Surgical History:   Procedure Laterality Date    LASIK  01/2021   [facility]US [facility]      TONSILLECTOMY  2001    UPPER GASTROINTESTINAL ENDOSCOPY         Family History   Problem Relation Age of Onset    Hyperthyroidism Mother     Diabetes Father     No Known Problems Sister     Hyperthyroidism Maternal Grandmother     No Known Problems Maternal Grandfather     No Known Problems Paternal Grandmother     No Known Problems Paternal Grandfather          Medications have been verified  Objective   /96   Pulse 102   Temp 97 6 °F (36 4 °C) (Tympanic)   Resp 18   Ht 5' (1 524 m)   Wt 67 kg (147 lb 9 6 oz)   SpO2 97%   BMI 28 83 kg/m²   No LMP recorded  Physical Exam     Physical Exam  Constitutional:       General: She is not in acute distress  Appearance: She is not ill-appearing  HENT:      Head: Normocephalic and atraumatic  Right Ear: Hearing, tympanic membrane, ear canal and external ear normal       Left Ear: Hearing, tympanic membrane, ear canal and external ear normal       Nose: Nose normal       Mouth/Throat:      Mouth: Mucous membranes are moist  No injury  Pharynx: No pharyngeal swelling, oropharyngeal exudate or posterior oropharyngeal erythema  Cardiovascular:      Rate and Rhythm: Normal rate and regular rhythm  Pulmonary:      Effort: Pulmonary effort is normal  No respiratory distress  Skin:     General: Skin is warm and dry  Neurological:      General: No focal deficit present  Mental Status: She is alert and oriented to person, place, and time  Psychiatric:         Mood and Affect: Mood normal          Behavior: Behavior normal          Thought Content:  Thought content normal          Judgment: Judgment normal

## 2022-08-26 ENCOUNTER — HOSPITAL ENCOUNTER (OUTPATIENT)
Dept: RADIOLOGY | Age: 22
Discharge: HOME | End: 2022-08-26
Payer: COMMERCIAL

## 2022-08-26 DIAGNOSIS — J33.9 NASAL POLYPOSIS: ICD-10-CM

## 2022-08-26 DIAGNOSIS — J32.9 CHRONIC SINUSITIS, UNSPECIFIED LOCATION: ICD-10-CM

## 2022-08-26 PROCEDURE — 70486 CT MAXILLOFACIAL W/O DYE: CPT

## 2022-09-15 ENCOUNTER — APPOINTMENT (OUTPATIENT)
Dept: LAB | Age: 22
End: 2022-09-15
Payer: COMMERCIAL

## 2022-09-15 DIAGNOSIS — Z01.818 PRE-OP TESTING: ICD-10-CM

## 2022-09-15 DIAGNOSIS — J32.9 FREQUENT SINUS INFECTIONS: ICD-10-CM

## 2022-09-15 DIAGNOSIS — J32.4 CHRONIC PANSINUSITIS: ICD-10-CM

## 2022-09-15 LAB
ANION GAP SERPL CALCULATED.3IONS-SCNC: 4 MMOL/L (ref 4–13)
BASOPHILS # BLD MANUAL: 0.13 THOUSAND/UL (ref 0–0.1)
BASOPHILS NFR MAR MANUAL: 1 % (ref 0–1)
BUN SERPL-MCNC: 19 MG/DL (ref 5–25)
CALCIUM SERPL-MCNC: 9 MG/DL (ref 8.3–10.1)
CHLORIDE SERPL-SCNC: 103 MMOL/L (ref 96–108)
CO2 SERPL-SCNC: 30 MMOL/L (ref 21–32)
CREAT SERPL-MCNC: 0.77 MG/DL (ref 0.6–1.3)
EOSINOPHIL # BLD MANUAL: 0.53 THOUSAND/UL (ref 0–0.4)
EOSINOPHIL NFR BLD MANUAL: 4 % (ref 0–6)
ERYTHROCYTE [DISTWIDTH] IN BLOOD BY AUTOMATED COUNT: 12.3 % (ref 11.6–15.1)
GFR SERPL CREATININE-BSD FRML MDRD: 109 ML/MIN/1.73SQ M
GLUCOSE P FAST SERPL-MCNC: 83 MG/DL (ref 65–99)
HCT VFR BLD AUTO: 44.2 % (ref 34.8–46.1)
HGB BLD-MCNC: 14.1 G/DL (ref 11.5–15.4)
LYMPHOCYTES # BLD AUTO: 55 % (ref 14–44)
LYMPHOCYTES # BLD AUTO: 7.31 THOUSAND/UL (ref 0.6–4.47)
MCH RBC QN AUTO: 30 PG (ref 26.8–34.3)
MCHC RBC AUTO-ENTMCNC: 31.9 G/DL (ref 31.4–37.4)
MCV RBC AUTO: 94 FL (ref 82–98)
MONOCYTES # BLD AUTO: 1.06 THOUSAND/UL (ref 0–1.22)
MONOCYTES NFR BLD: 8 % (ref 4–12)
NEUTROPHILS # BLD MANUAL: 4.25 THOUSAND/UL (ref 1.85–7.62)
NEUTS BAND NFR BLD MANUAL: 1 % (ref 0–8)
NEUTS SEG NFR BLD AUTO: 31 % (ref 43–75)
PLATELET # BLD AUTO: 382 THOUSANDS/UL (ref 149–390)
PLATELET BLD QL SMEAR: ADEQUATE
PMV BLD AUTO: 10.5 FL (ref 8.9–12.7)
POTASSIUM SERPL-SCNC: 4 MMOL/L (ref 3.5–5.3)
RBC # BLD AUTO: 4.7 MILLION/UL (ref 3.81–5.12)
RBC MORPH BLD: NORMAL
SODIUM SERPL-SCNC: 137 MMOL/L (ref 135–147)
WBC # BLD AUTO: 13.29 THOUSAND/UL (ref 4.31–10.16)

## 2022-09-15 PROCEDURE — 85007 BL SMEAR W/DIFF WBC COUNT: CPT

## 2022-09-15 PROCEDURE — 80048 BASIC METABOLIC PNL TOTAL CA: CPT

## 2022-09-15 PROCEDURE — 85027 COMPLETE CBC AUTOMATED: CPT

## 2022-09-15 PROCEDURE — 36415 COLL VENOUS BLD VENIPUNCTURE: CPT

## 2022-09-21 ENCOUNTER — LAB REQUISITION (OUTPATIENT)
Dept: LAB | Facility: HOSPITAL | Age: 22
End: 2022-09-21
Payer: COMMERCIAL

## 2022-09-21 DIAGNOSIS — J32.4 CHRONIC PANSINUSITIS: ICD-10-CM

## 2022-09-21 PROCEDURE — 88304 TISSUE EXAM BY PATHOLOGIST: CPT | Performed by: STUDENT IN AN ORGANIZED HEALTH CARE EDUCATION/TRAINING PROGRAM

## 2022-09-28 PROCEDURE — 88304 TISSUE EXAM BY PATHOLOGIST: CPT | Performed by: STUDENT IN AN ORGANIZED HEALTH CARE EDUCATION/TRAINING PROGRAM

## 2023-03-07 ENCOUNTER — APPOINTMENT (OUTPATIENT)
Dept: LAB | Age: 23
End: 2023-03-07

## 2023-03-07 ENCOUNTER — OFFICE VISIT (OUTPATIENT)
Dept: FAMILY MEDICINE CLINIC | Facility: CLINIC | Age: 23
End: 2023-03-07

## 2023-03-07 VITALS
HEART RATE: 98 BPM | TEMPERATURE: 98.4 F | OXYGEN SATURATION: 97 % | WEIGHT: 172 LBS | HEIGHT: 60 IN | DIASTOLIC BLOOD PRESSURE: 78 MMHG | SYSTOLIC BLOOD PRESSURE: 128 MMHG | RESPIRATION RATE: 16 BRPM | BODY MASS INDEX: 33.77 KG/M2

## 2023-03-07 DIAGNOSIS — R73.9 HYPERGLYCEMIA: ICD-10-CM

## 2023-03-07 DIAGNOSIS — R63.5 WEIGHT GAIN: ICD-10-CM

## 2023-03-07 DIAGNOSIS — R73.9 HYPERGLYCEMIA: Primary | ICD-10-CM

## 2023-03-07 DIAGNOSIS — J45.20 MILD INTERMITTENT ASTHMA WITHOUT COMPLICATION: ICD-10-CM

## 2023-03-07 DIAGNOSIS — Z00.00 HEALTHCARE MAINTENANCE: ICD-10-CM

## 2023-03-07 DIAGNOSIS — F41.9 ANXIETY: ICD-10-CM

## 2023-03-07 LAB
ALBUMIN SERPL BCP-MCNC: 4.2 G/DL (ref 3.5–5)
ALP SERPL-CCNC: 71 U/L (ref 46–116)
ALT SERPL W P-5'-P-CCNC: 43 U/L (ref 12–78)
ANION GAP SERPL CALCULATED.3IONS-SCNC: 6 MMOL/L (ref 4–13)
AST SERPL W P-5'-P-CCNC: 24 U/L (ref 5–45)
BASOPHILS # BLD AUTO: 0.09 THOUSANDS/ÂΜL (ref 0–0.1)
BASOPHILS NFR BLD AUTO: 2 % (ref 0–1)
BILIRUB SERPL-MCNC: 0.43 MG/DL (ref 0.2–1)
BUN SERPL-MCNC: 14 MG/DL (ref 5–25)
CALCIUM SERPL-MCNC: 9.3 MG/DL (ref 8.3–10.1)
CHLORIDE SERPL-SCNC: 103 MMOL/L (ref 96–108)
CHOLEST SERPL-MCNC: 160 MG/DL
CO2 SERPL-SCNC: 26 MMOL/L (ref 21–32)
CREAT SERPL-MCNC: 0.58 MG/DL (ref 0.6–1.3)
EOSINOPHIL # BLD AUTO: 0.21 THOUSAND/ÂΜL (ref 0–0.61)
EOSINOPHIL NFR BLD AUTO: 4 % (ref 0–6)
ERYTHROCYTE [DISTWIDTH] IN BLOOD BY AUTOMATED COUNT: 12 % (ref 11.6–15.1)
EST. AVERAGE GLUCOSE BLD GHB EST-MCNC: 97 MG/DL
GFR SERPL CREATININE-BSD FRML MDRD: 129 ML/MIN/1.73SQ M
GLUCOSE P FAST SERPL-MCNC: 93 MG/DL (ref 65–99)
HBA1C MFR BLD: 5 %
HCT VFR BLD AUTO: 41.6 % (ref 34.8–46.1)
HDLC SERPL-MCNC: 48 MG/DL
HGB BLD-MCNC: 13.8 G/DL (ref 11.5–15.4)
IMM GRANULOCYTES # BLD AUTO: 0.01 THOUSAND/UL (ref 0–0.2)
IMM GRANULOCYTES NFR BLD AUTO: 0 % (ref 0–2)
INSULIN SERPL-ACNC: 12.5 MU/L (ref 3–25)
LDLC SERPL CALC-MCNC: 87 MG/DL (ref 0–100)
LYMPHOCYTES # BLD AUTO: 1.95 THOUSANDS/ÂΜL (ref 0.6–4.47)
LYMPHOCYTES NFR BLD AUTO: 37 % (ref 14–44)
MCH RBC QN AUTO: 30.3 PG (ref 26.8–34.3)
MCHC RBC AUTO-ENTMCNC: 33.2 G/DL (ref 31.4–37.4)
MCV RBC AUTO: 91 FL (ref 82–98)
MONOCYTES # BLD AUTO: 0.64 THOUSAND/ÂΜL (ref 0.17–1.22)
MONOCYTES NFR BLD AUTO: 12 % (ref 4–12)
NEUTROPHILS # BLD AUTO: 2.35 THOUSANDS/ÂΜL (ref 1.85–7.62)
NEUTS SEG NFR BLD AUTO: 45 % (ref 43–75)
NRBC BLD AUTO-RTO: 0 /100 WBCS
PLATELET # BLD AUTO: 345 THOUSANDS/UL (ref 149–390)
PMV BLD AUTO: 11 FL (ref 8.9–12.7)
POTASSIUM SERPL-SCNC: 3.8 MMOL/L (ref 3.5–5.3)
PROT SERPL-MCNC: 7.6 G/DL (ref 6.4–8.4)
RBC # BLD AUTO: 4.55 MILLION/UL (ref 3.81–5.12)
SODIUM SERPL-SCNC: 135 MMOL/L (ref 135–147)
TRIGL SERPL-MCNC: 127 MG/DL
TSH SERPL DL<=0.05 MIU/L-ACNC: 2.12 UIU/ML (ref 0.45–4.5)
WBC # BLD AUTO: 5.25 THOUSAND/UL (ref 4.31–10.16)

## 2023-03-07 NOTE — ASSESSMENT & PLAN NOTE
- Will check labs including fasting insulin  - Encourage healthy diet and regular exercise  - Will continue to follow up

## 2023-03-07 NOTE — ASSESSMENT & PLAN NOTE
- Will check labs including hemoglobin A1c    - Encourage healthy diet and regular exercise  - Will continue to follow up

## 2023-03-07 NOTE — ASSESSMENT & PLAN NOTE
- Reviewed immunizations and screenings  - UTD with dental exam    - No vision changes - had lasik surgery  - UTD with GYN exam    - Routine labs ordered

## 2023-03-07 NOTE — PROGRESS NOTES
Name: Julio Vásquez      : 2000      MRN: [id number]  Encounter Provider: ZOEY Moore  Encounter Date: 3/7/2023   Encounter department: [address] [place] [address]  Hyperglycemia  Assessment & Plan:  - Will check labs including hemoglobin A1c    - Encourage healthy diet and regular exercise  - Will continue to follow up  Orders:  -     Hemoglobin A1C; Future    2  Weight gain  Assessment & Plan:  - Will check labs including fasting insulin  - Encourage healthy diet and regular exercise  - Will continue to follow up  Orders:  -     Insulin, fasting; Future    3  Anxiety  Assessment & Plan:  - Well controlled on Cymbalta daily  Continue same    - Will continue to monitor  4  Mild intermittent asthma without complication  Assessment & Plan:  - Well controlled  - Continue albuterol inhaler as needed  - Will continue to monitor  5  Healthcare maintenance  Assessment & Plan:  - Reviewed immunizations and screenings  - UTD with dental exam    - No vision changes - had lasik surgery  - UTD with GYN exam    - Routine labs ordered  Orders:  -     CBC and differential; Future  -     Comprehensive metabolic panel; Future  -     Lipid Panel with Direct LDL reflex; Future  -     TSH, 3rd generation with Free T4 reflex; Future           Subjective     Patient with PMH of asthma, anxiety, and chronic parasinusitis presents today to establish care  She has been on Cymbalta for her anxiety for several years and is doing well on the medication  She has complaints today of elevated blood sugar readings  She states she has a history of hypoglycemia so she does have access to a glucometer  She felt like her sugar was low so she checked it and it was 200  This was around 10 am  She had a glass of green tea piror to that at 9 am but she did not add any cream or sugar to her tea   She has been checking her blood sugar for the past week and it has been 150-170s about 2 hours after eating  She does report a family history of diabetes in her father which she thinks is type 2  She does endorse excessive thirst and urination but she attributes that to drinking a lot of water  She also has complaints today of a 40 lb weight gain in the last 8 months  She denies any significant lifestyle changes  She goes to the gym 1 day per week for about an hour and does cardio and weights  She walks her dog 1-2 times per week for about 20 minutes  Tries to consume a healthy diet  She denies any other concerns or complaints today  Review of Systems   Constitutional: Positive for unexpected weight change (weight gain)  Negative for fatigue and fever  HENT: Negative for congestion, rhinorrhea and trouble swallowing  Eyes: Negative for visual disturbance  Respiratory: Negative for cough and shortness of breath  Cardiovascular: Negative for chest pain and palpitations  Gastrointestinal: Negative for abdominal pain and blood in stool  Endocrine: Positive for polydipsia and polyuria  Negative for cold intolerance and heat intolerance  Genitourinary: Positive for frequency  Negative for difficulty urinating and dysuria  Musculoskeletal: Negative for gait problem  Skin: Negative for rash  Neurological: Positive for light-headedness  Negative for dizziness, syncope and headaches  Hematological: Negative for adenopathy  Psychiatric/Behavioral: Negative for behavioral problems         Past Medical History:   Diagnosis Date   • Allergic rhinitis    • Asthma    • Environmental allergies    • Lyme disease    • Reflux esophagitis      Past Surgical History:   Procedure Laterality Date   • ADENOIDECTOMY     • LASIK  01/2021   • NASAL SINUS SURGERY     • TONSILLECTOMY  2001   • UPPER GASTROINTESTINAL ENDOSCOPY       Family History   Problem Relation Age of Onset   • Hyperthyroidism Mother    • Clotting disorder Mother    • Diabetes Father    • No Known Problems Sister    • Hyperthyroidism Maternal Grandmother    • No Known Problems Maternal Grandfather    • No Known Problems Paternal Grandmother    • No Known Problems Paternal Grandfather      Social History     Socioeconomic History   • Marital status: Single     Spouse name: None   • Number of children: None   • Years of education: None   • Highest education level: None   Occupational History   • None   Tobacco Use   • Smoking status: Never   • Smokeless tobacco: Never   Vaping Use   • Vaping Use: Never used   Substance and Sexual Activity   • Alcohol use: Yes     Comment: socially    • Drug use: Never   • Sexual activity: Yes     Partners: Male     Birth control/protection: I U D  Other Topics Concern   • None   Social History Narrative    Do you have pets? Dog allowed in bedroom    Are you a smoker? Never    Does anyone smoke in your home? No       Do you live with smokers? No    Travel [place] frequently? No   How many times a year?  N/A      Social Determinants of Health     Financial Resource Strain: Not on file   Food Insecurity: Not on file   Transportation Needs: Not on file   Physical Activity: Not on file   Stress: Not on file   Social Connections: Not on file   Intimate Partner Violence: Not on file   Housing Stability: Not on file     Current Outpatient Medications on File Prior to Visit   Medication Sig   • albuterol (Ventolin HFA) 90 mcg/act inhaler Inhale 2 puffs every 4 (four) hours as needed for wheezing   • DULoxetine (CYMBALTA) 30 mg delayed release capsule Take 30 mg by mouth daily   • levonorgestrel (KYLEENA) 19 5 MG intrauterine device 1 Intra Uterine Device by Intrauterine route once   • amphetamine-dextroamphetamine (ADDERALL) 15 MG tablet Take 15 mg by mouth daily    • cetirizine (ZyrTEC) 10 mg tablet Take 10 mg by mouth as needed for allergies   • EPINEPHrine (EPIPEN) 0 3 mg/0 3 mL SOAJ Inject 0 3 mL (0 3 mg total) into a muscle once for 1 dose   • fluticasone (FLONASE) 50 mcg/act nasal spray 1 spray into each nostril daily as needed for rhinitis   • [DISCONTINUED] HYDROcodone-acetaminophen (Norco) 5-325 mg per tablet Take 1 tablet by mouth every 6 (six) hours as needed for pain Max Daily Amount: 4 tablets   • [DISCONTINUED] Spacer/Aero-Holding Chambers (Vortex Valved Holding Chamber) MIKE Use 2 (two) times a day     Allergies   Allergen Reactions   • Clindamycin/Lincomycin Hives     hives and flushed   • Latex      Immunization History   Administered Date(s) Administered   • HPV9 07/09/2021, 09/10/2021       Objective     /78 (BP Location: Left arm, Patient Position: Sitting, Cuff Size: Adult)   Pulse 98   Temp 98 4 °F (36 9 °C) (Tympanic)   Resp 16   Ht 5' (1 524 m)   Wt 78 kg (172 lb)   SpO2 97%   BMI 33 59 kg/m²     Physical Exam  Vitals and nursing note reviewed  Constitutional:       General: She is not in acute distress  Appearance: Normal appearance  She is not ill-appearing  HENT:      Head: Normocephalic and atraumatic  Right Ear: Tympanic membrane, ear canal and external ear normal       Left Ear: Tympanic membrane, ear canal and external ear normal       Nose: No congestion  Mouth/Throat:      Mouth: Mucous membranes are moist    Eyes:      Extraocular Movements: Extraocular movements intact  Conjunctiva/sclera: Conjunctivae normal       Pupils: Pupils are equal, round, and reactive to light  Cardiovascular:      Rate and Rhythm: Normal rate and regular rhythm  Heart sounds: Normal heart sounds  Pulmonary:      Effort: Pulmonary effort is normal       Breath sounds: Normal breath sounds  Abdominal:      General: Bowel sounds are normal       Palpations: Abdomen is soft  Musculoskeletal:         General: Normal range of motion  Cervical back: Normal range of motion  Lymphadenopathy:      Cervical: No cervical adenopathy  Skin:     General: Skin is warm and dry     Neurological:      Mental Status: She is alert and oriented to person, place, and time  Cranial Nerves: No cranial nerve deficit     Psychiatric:         Mood and Affect: Mood normal          Behavior: Behavior normal        ZOEY Thomas

## 2023-03-10 ENCOUNTER — TELEPHONE (OUTPATIENT)
Dept: FAMILY MEDICINE CLINIC | Facility: CLINIC | Age: 23
End: 2023-03-10

## 2023-03-10 NOTE — TELEPHONE ENCOUNTER
Lm for pt to check her my chart for results and provider message   She is to call with any questions and to schedule appointment

## 2023-03-10 NOTE — TELEPHONE ENCOUNTER
----- Message from Nohemy Dos Santos, [address] sent at 3/10/2023  8:20 AM EST -----  Sukhwinder Montejo,   Your labs are all fairly stable  HDL (good cholesterol) is slightly low  Ideally would like above 50  This can improve with diet and exercise  Hemoglobin A1c and fasting insulin are both normal as well as thyroid studies  If you'd like to schedule another appointment to discuss weight loss you are more than welcome! Thanks and have a great weekend    ZOEY Moreau Che

## 2023-03-14 ENCOUNTER — OFFICE VISIT (OUTPATIENT)
Dept: FAMILY MEDICINE CLINIC | Facility: CLINIC | Age: 23
End: 2023-03-14

## 2023-03-14 ENCOUNTER — TELEPHONE (OUTPATIENT)
Dept: FAMILY MEDICINE CLINIC | Facility: CLINIC | Age: 23
End: 2023-03-14

## 2023-03-14 VITALS
HEART RATE: 92 BPM | WEIGHT: 166.8 LBS | HEIGHT: 60 IN | SYSTOLIC BLOOD PRESSURE: 118 MMHG | TEMPERATURE: 97.6 F | RESPIRATION RATE: 16 BRPM | BODY MASS INDEX: 32.75 KG/M2 | OXYGEN SATURATION: 97 % | DIASTOLIC BLOOD PRESSURE: 60 MMHG

## 2023-03-14 DIAGNOSIS — J45.20 MILD INTERMITTENT ASTHMA WITHOUT COMPLICATION: ICD-10-CM

## 2023-03-14 DIAGNOSIS — F41.9 ANXIETY: Primary | ICD-10-CM

## 2023-03-14 DIAGNOSIS — E66.09 CLASS 1 OBESITY DUE TO EXCESS CALORIES WITHOUT SERIOUS COMORBIDITY WITH BODY MASS INDEX (BMI) OF 32.0 TO 32.9 IN ADULT: ICD-10-CM

## 2023-03-14 PROBLEM — E66.811 CLASS 1 OBESITY DUE TO EXCESS CALORIES WITHOUT SERIOUS COMORBIDITY WITH BODY MASS INDEX (BMI) OF 32.0 TO 32.9 IN ADULT: Status: ACTIVE | Noted: 2023-03-14

## 2023-03-14 NOTE — ASSESSMENT & PLAN NOTE
- Discussed different pharmacologic weight loss options along with side effects  She is interested in trying [facility]  Prescription sent for 0 25 mg weekly x 4 weeks  - Encouraged continuation of healthy diet and exercise   - Recommend follow up in 1 month

## 2023-03-14 NOTE — PROGRESS NOTES
Name: Norma Galeano      : 2000      MRN: [id number]  Encounter Provider: ZOEY Culver  Encounter Date: 3/14/2023   Encounter department: [address] [place] [address]  Anxiety  Assessment & Plan:  - Well controlled on Cymbalta  Continue same    - Will continue to monitor  2  Class 1 obesity due to excess calories without serious comorbidity with body mass index (BMI) of 32 0 to 32 9 in adult  Assessment & Plan:  - Discussed different pharmacologic weight loss options along with side effects  She is interested in trying [facility]  Prescription sent for 0 25 mg weekly x 4 weeks  - Encouraged continuation of healthy diet and exercise   - Recommend follow up in 1 month  Orders:  -     Semaglutide-Weight Management (WEGOVY) 0 25 MG/0 5ML; Inject 0 5 mL (0 25 mg total) under the skin once a week for 4 doses    3  Mild intermittent asthma without complication  Assessment & Plan:  - Well controlled  - Continue allergy medication in addition to albuterol as needed  - Will continue to monitor  Subjective     Patient presents today with concerns regarding weight gain  Reports 40 pound weight gain in the past 8 months despite exercising and watching her diet  She had her blood work done which was all unremarkable  She is interested in pharmacological therapy for weight management  Review of Systems   Constitutional: Positive for unexpected weight change (weight gain )  Negative for fatigue and fever  HENT: Negative for trouble swallowing  Eyes: Negative for visual disturbance  Respiratory: Negative for cough and shortness of breath  Cardiovascular: Negative for chest pain and palpitations  Gastrointestinal: Negative for abdominal pain and blood in stool  Endocrine: Negative for cold intolerance and heat intolerance  Genitourinary: Negative for difficulty urinating and dysuria  Musculoskeletal: Negative for gait problem     Skin: Negative for rash  Neurological: Negative for dizziness, syncope and headaches  Hematological: Negative for adenopathy  Psychiatric/Behavioral: Negative for behavioral problems  Past Medical History:   Diagnosis Date   • Allergic rhinitis    • Asthma    • Environmental allergies    • Lyme disease    • Reflux esophagitis      Past Surgical History:   Procedure Laterality Date   • ADENOIDECTOMY     • LASIK  01/2021   • NASAL SINUS SURGERY     • TONSILLECTOMY  2001   • UPPER GASTROINTESTINAL ENDOSCOPY       Family History   Problem Relation Age of Onset   • Hyperthyroidism Mother    • Clotting disorder Mother    • Diabetes Father    • No Known Problems Sister    • Hyperthyroidism Maternal Grandmother    • No Known Problems Maternal Grandfather    • No Known Problems Paternal Grandmother    • No Known Problems Paternal Grandfather      Social History     Socioeconomic History   • Marital status: Single     Spouse name: None   • Number of children: None   • Years of education: None   • Highest education level: None   Occupational History   • None   Tobacco Use   • Smoking status: Never   • Smokeless tobacco: Never   Vaping Use   • Vaping Use: Never used   Substance and Sexual Activity   • Alcohol use: Yes     Comment: socially    • Drug use: Never   • Sexual activity: Yes     Partners: Male     Birth control/protection: I U D  Other Topics Concern   • None   Social History Narrative    Do you have pets? Dog allowed in bedroom    Are you a smoker? Never    Does anyone smoke in your home? No       Do you live with smokers? No    Travel [place] frequently? No   How many times a year?  N/A      Social Determinants of Health     Financial Resource Strain: Not on file   Food Insecurity: Not on file   Transportation Needs: Not on file   Physical Activity: Not on file   Stress: Not on file   Social Connections: Not on file   Intimate Partner Violence: Not on file   Housing Stability: Not on file     Current Outpatient Medications on File Prior to Visit   Medication Sig   • albuterol (Ventolin HFA) 90 mcg/act inhaler Inhale 2 puffs every 4 (four) hours as needed for wheezing   • amphetamine-dextroamphetamine (ADDERALL) 15 MG tablet Take 15 mg by mouth daily    • cetirizine (ZyrTEC) 10 mg tablet Take 10 mg by mouth as needed for allergies   • DULoxetine (CYMBALTA) 30 mg delayed release capsule Take 30 mg by mouth daily   • fluticasone (FLONASE) 50 mcg/act nasal spray 1 spray into each nostril daily as needed for rhinitis   • levonorgestrel (KYLEENA) 19 5 MG intrauterine device 1 Intra Uterine Device by Intrauterine route once   • EPINEPHrine (EPIPEN) 0 3 mg/0 3 mL SOAJ Inject 0 3 mL (0 3 mg total) into a muscle once for 1 dose     Allergies   Allergen Reactions   • Clindamycin/Lincomycin Hives     hives and flushed   • Latex      Immunization History   Administered Date(s) Administered   • HPV9 07/09/2021, 09/10/2021       Objective     /60 (BP Location: Left arm, Patient Position: Sitting, Cuff Size: Standard)   Pulse 92   Temp 97 6 °F (36 4 °C) (Tympanic)   Resp 16   Ht 5' (1 524 m)   Wt 75 7 kg (166 lb 12 8 oz)   SpO2 97%   BMI 32 58 kg/m²     Physical Exam  Vitals and nursing note reviewed  Constitutional:       General: She is not in acute distress  Appearance: Normal appearance  She is not ill-appearing  HENT:      Head: Normocephalic and atraumatic  Right Ear: External ear normal       Left Ear: External ear normal    Eyes:      Conjunctiva/sclera: Conjunctivae normal    Cardiovascular:      Rate and Rhythm: Normal rate and regular rhythm  Heart sounds: Normal heart sounds  Pulmonary:      Effort: Pulmonary effort is normal       Breath sounds: Normal breath sounds  Musculoskeletal:         General: Normal range of motion  Cervical back: Normal range of motion  Skin:     General: Skin is warm and dry     Neurological:      Mental Status: She is alert and oriented to person, place, and time       Cranial Nerves: No cranial nerve deficit     Psychiatric:         Mood and Affect: Mood normal          Behavior: Behavior normal        ZOEY Alanis

## 2023-03-14 NOTE — ASSESSMENT & PLAN NOTE
- Well controlled  - Continue allergy medication in addition to albuterol as needed  - Will continue to monitor

## 2023-03-21 ENCOUNTER — TELEPHONE (OUTPATIENT)
Dept: FAMILY MEDICINE CLINIC | Facility: CLINIC | Age: 23
End: 2023-03-21

## 2023-03-21 NOTE — TELEPHONE ENCOUNTER
Patient left a message and said she was told by pharmacy she needs a prior auth for the medication given by [facility]   She did not mention the medication

## 2023-03-22 ENCOUNTER — TELEPHONE (OUTPATIENT)
Dept: FAMILY MEDICINE CLINIC | Facility: CLINIC | Age: 23
End: 2023-03-22

## 2023-03-22 NOTE — TELEPHONE ENCOUNTER
wegovy denied , needs failure of step 1 medication: phentermine, benzphetamine, diethylpropion or letter of medical necessary stating why pt can not take step one medication such as BP etc

## 2023-03-22 NOTE — TELEPHONE ENCOUNTER
Patient called and said someone mentioned another medication like phentermine but she is concerned that it is a stimulant and she is already on adderall

## 2023-03-23 DIAGNOSIS — E66.09 CLASS 1 OBESITY DUE TO EXCESS CALORIES WITHOUT SERIOUS COMORBIDITY WITH BODY MASS INDEX (BMI) OF 32.0 TO 32.9 IN ADULT: Primary | ICD-10-CM

## 2023-03-23 RX ORDER — PHENTERMINE HYDROCHLORIDE 37.5 MG/1
37.5 TABLET ORAL DAILY
Qty: 30 TABLET | Refills: 0 | Status: SHIPPED | OUTPATIENT
Start: 2023-03-23 | End: 2023-04-11 | Stop reason: SDUPTHER

## 2023-03-23 NOTE — TELEPHONE ENCOUNTER
Per PDMP it doesn't look like she has been prescribed Adderall since October 2022  Who is her prescribing provider? If she is still taking would not recommend taking with phentermine

## 2023-04-03 ENCOUNTER — RA CDI HCC (OUTPATIENT)
Dept: OTHER | Facility: HOSPITAL | Age: 23
End: 2023-04-03

## 2023-04-03 NOTE — PROGRESS NOTES
[facility]  coding opportunities       Chart reviewed, no opportunity found: CHART REVIEWED, NO OPPORTUNITY FOUND        Patients Insurance        Commercial Insurance: Massimo Andrade

## 2023-04-25 ENCOUNTER — TELEPHONE (OUTPATIENT)
Dept: FAMILY MEDICINE CLINIC | Facility: CLINIC | Age: 23
End: 2023-04-25

## 2023-04-25 NOTE — TELEPHONE ENCOUNTER
----- Message from Eulalio Benjamin sent at 4/25/2023  8:42 AM EDT -----  Regarding: Phentermine   Contact: [phone]  Hello, I am having a lot of side effects from the medication  I’m feeling really foggy and my heart is racing a lot  I wanted to see if I could stop the medication prior to my next appointment on may 9th

## 2023-05-06 PROBLEM — Z00.00 HEALTHCARE MAINTENANCE: Status: RESOLVED | Noted: 2023-03-07 | Resolved: 2023-05-06

## 2023-05-09 ENCOUNTER — TELEPHONE (OUTPATIENT)
Dept: FAMILY MEDICINE CLINIC | Facility: CLINIC | Age: 23
End: 2023-05-09

## 2023-05-09 ENCOUNTER — OFFICE VISIT (OUTPATIENT)
Dept: FAMILY MEDICINE CLINIC | Facility: CLINIC | Age: 23
End: 2023-05-09

## 2023-05-09 VITALS
OXYGEN SATURATION: 98 % | RESPIRATION RATE: 16 BRPM | WEIGHT: 166.8 LBS | DIASTOLIC BLOOD PRESSURE: 80 MMHG | BODY MASS INDEX: 32.75 KG/M2 | SYSTOLIC BLOOD PRESSURE: 118 MMHG | HEART RATE: 96 BPM | TEMPERATURE: 99.5 F | HEIGHT: 60 IN

## 2023-05-09 DIAGNOSIS — E66.09 CLASS 1 OBESITY DUE TO EXCESS CALORIES WITHOUT SERIOUS COMORBIDITY WITH BODY MASS INDEX (BMI) OF 33.0 TO 33.9 IN ADULT: ICD-10-CM

## 2023-05-09 DIAGNOSIS — F41.9 ANXIETY: Primary | ICD-10-CM

## 2023-05-09 DIAGNOSIS — F19.982 DRUG INDUCED INSOMNIA (HCC): ICD-10-CM

## 2023-05-09 RX ORDER — DULOXETIN HYDROCHLORIDE 30 MG/1
30 CAPSULE, DELAYED RELEASE ORAL DAILY
Qty: 90 CAPSULE | Refills: 1 | Status: SHIPPED | OUTPATIENT
Start: 2023-05-09 | End: 2023-08-07

## 2023-05-09 NOTE — ASSESSMENT & PLAN NOTE
- Could not tolerate phentermine due to palpations and insomnia  - Will send prescription for Wegovy 0 25 mg weekly x 4 weeks  Advised of side effects   - Encourage healthy diet and regular exercise   - Recommend follow up in 1 month

## 2023-05-09 NOTE — TELEPHONE ENCOUNTER
I received form from [address] and I faxed the completed form along with recent visit to the fax number on form   We are awaiting determination

## 2023-05-09 NOTE — PROGRESS NOTES
"Name: Lawrence Baker      : 2000      MRN: [id number]  Encounter Provider: ZOEY Fatima  Encounter Date: 2023   Encounter department: [address] [place] [address]  Anxiety  Assessment & Plan:  - Well controlled on Cymbalta 30 mg daily  Continue same    - Will continue to monitor  Orders:  -     DULoxetine (CYMBALTA) 30 mg delayed release capsule; Take 1 capsule (30 mg total) by mouth daily    2  Class 1 obesity due to excess calories without serious comorbidity with body mass index (BMI) of 33 0 to 33 9 in adult  Assessment & Plan:  - Could not tolerate phentermine due to palpations and insomnia  - Will send prescription for Wegovy 0 25 mg weekly x 4 weeks  Advised of side effects   - Encourage healthy diet and regular exercise   - Recommend follow up in 1 month  Orders:  -     Semaglutide-Weight Management (WEGOVY) 0 25 MG/0 5ML; Inject 0 5 mL (0 25 mg total) under the skin once a week for 4 doses    3  Drug induced insomnia ([facility] [address] )  Assessment & Plan:  - Improved since discontinuing phentermine    - Will continue to monitor  Subjective     Patient presents today for follow up  She was taking phentermine for weight loss but had complaints of feeling \"foggy  \" Thought it was just because 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 but her symptoms persisted  Also complained of tachycardia and palpitations  HR reached 160's when she checked her watch  She also had trouble sleeping with the medication  Would normally go to sleep around 10 pm but wasn't able to fall asleep until 2 am while on the phentermine  Since discontinuing the phentermine her symptoms have improved  No longer complains of heart palpitations or trouble sleeping  Review of Systems   Constitutional: Negative for fatigue and fever  HENT: Negative for trouble swallowing  Eyes: Negative for visual disturbance     Respiratory: Negative for cough and shortness of " breath  Cardiovascular: Positive for palpitations  Negative for chest pain  Gastrointestinal: Negative for abdominal pain and blood in stool  Endocrine: Negative for cold intolerance and heat intolerance  Genitourinary: Negative for difficulty urinating and dysuria  Musculoskeletal: Negative for gait problem  Skin: Negative for rash  Neurological: Negative for dizziness, syncope and headaches  Hematological: Negative for adenopathy  Psychiatric/Behavioral: Positive for sleep disturbance  Negative for behavioral problems  Past Medical History:   Diagnosis Date   • Allergic rhinitis    • Asthma    • Environmental allergies    • Lyme disease    • Reflux esophagitis      Past Surgical History:   Procedure Laterality Date   • ADENOIDECTOMY     • LASIK  01/2021   • NASAL SINUS SURGERY     • TONSILLECTOMY  2001   • UPPER GASTROINTESTINAL ENDOSCOPY       Family History   Problem Relation Age of Onset   • Hyperthyroidism Mother    • Clotting disorder Mother    • Diabetes Father    • No Known Problems Sister    • Hyperthyroidism Maternal Grandmother    • No Known Problems Maternal Grandfather    • No Known Problems Paternal Grandmother    • No Known Problems Paternal Grandfather      Social History     Socioeconomic History   • Marital status: Single     Spouse name: None   • Number of children: None   • Years of education: None   • Highest education level: None   Occupational History   • None   Tobacco Use   • Smoking status: Never   • Smokeless tobacco: Never   Vaping Use   • Vaping Use: Never used   Substance and Sexual Activity   • Alcohol use: Yes     Comment: socially    • Drug use: Never   • Sexual activity: Yes     Partners: Male     Birth control/protection: I U D  Other Topics Concern   • None   Social History Narrative    Do you have pets? Dog allowed in bedroom    Are you a smoker? Never    Does anyone smoke in your home? No       Do you live with smokers? No    Travel [place] [address] frequently? No   How many times a year? N/A      Social Determinants of Health     Financial Resource Strain: Not on file   Food Insecurity: Not on file   Transportation Needs: Not on file   Physical Activity: Not on file   Stress: Not on file   Social Connections: Not on file   Intimate Partner Violence: Not on file   Housing Stability: Not on file     Current Outpatient Medications on File Prior to Visit   Medication Sig   • albuterol (Ventolin HFA) 90 mcg/act inhaler Inhale 2 puffs every 4 (four) hours as needed for wheezing   • amphetamine-dextroamphetamine (ADDERALL) 15 MG tablet Take 15 mg by mouth daily    • cetirizine (ZyrTEC) 10 mg tablet Take 10 mg by mouth as needed for allergies   • fluticasone (FLONASE) 50 mcg/act nasal spray 1 spray into each nostril daily as needed for rhinitis   • levonorgestrel (KYLEENA) 19 5 MG intrauterine device 1 Intra Uterine Device by Intrauterine route once   • [DISCONTINUED] DULoxetine (CYMBALTA) 30 mg delayed release capsule Take 30 mg by mouth daily   • EPINEPHrine (EPIPEN) 0 3 mg/0 3 mL SOAJ Inject 0 3 mL (0 3 mg total) into a muscle once for 1 dose   • phentermine (ADIPEX-P) 37 5 MG tablet Take 1 tablet (37 5 mg total) by mouth in the morning (Patient not taking: Reported on 5/9/2023)     Allergies   Allergen Reactions   • Clindamycin/Lincomycin Hives     hives and flushed   • Latex      Immunization History   Administered Date(s) Administered   • HPV9 07/09/2021, 09/10/2021       Objective     /80 (BP Location: Left arm, Patient Position: Sitting, Cuff Size: Standard)   Pulse 96   Temp 99 5 °F (37 5 °C) (Tympanic)   Resp 16   Ht 5' (1 524 m)   Wt 75 7 kg (166 lb 12 8 oz)   SpO2 98%   BMI 32 58 kg/m²     Physical Exam  Vitals and nursing note reviewed  Constitutional:       General: She is not in acute distress  Appearance: Normal appearance  She is not ill-appearing  HENT:      Head: Normocephalic and atraumatic        Right Ear: External ear normal       Left Ear: External ear normal    Eyes:      Conjunctiva/sclera: Conjunctivae normal    Cardiovascular:      Rate and Rhythm: Normal rate and regular rhythm  Heart sounds: Normal heart sounds  Pulmonary:      Effort: Pulmonary effort is normal       Breath sounds: Normal breath sounds  Musculoskeletal:         General: Normal range of motion  Cervical back: Normal range of motion  Skin:     General: Skin is warm and dry  Neurological:      Mental Status: She is alert and oriented to person, place, and time  Cranial Nerves: No cranial nerve deficit     Psychiatric:         Mood and Affect: Mood normal          Behavior: Behavior normal        ZOEY Licona

## 2023-05-09 NOTE — TELEPHONE ENCOUNTER
We received a fax from [facility] that patient [facility] needs a prior auth through [employer]  I attempted prior auth and was directed to call [phone] because Adriana does not use the cover [employer] site   They will be faxing a form to be completed for determination

## 2023-06-06 ENCOUNTER — OFFICE VISIT (OUTPATIENT)
Dept: FAMILY MEDICINE CLINIC | Facility: CLINIC | Age: 23
End: 2023-06-06
Payer: COMMERCIAL

## 2023-06-06 VITALS
OXYGEN SATURATION: 97 % | BODY MASS INDEX: 32.18 KG/M2 | WEIGHT: 163.9 LBS | RESPIRATION RATE: 16 BRPM | HEART RATE: 94 BPM | SYSTOLIC BLOOD PRESSURE: 120 MMHG | DIASTOLIC BLOOD PRESSURE: 80 MMHG | TEMPERATURE: 99 F | HEIGHT: 60 IN

## 2023-06-06 DIAGNOSIS — E66.09 CLASS 1 OBESITY DUE TO EXCESS CALORIES WITHOUT SERIOUS COMORBIDITY WITH BODY MASS INDEX (BMI) OF 33.0 TO 33.9 IN ADULT: ICD-10-CM

## 2023-06-06 DIAGNOSIS — F41.9 ANXIETY: Primary | ICD-10-CM

## 2023-06-06 PROCEDURE — 99213 OFFICE O/P EST LOW 20 MIN: CPT | Performed by: NURSE PRACTITIONER

## 2023-06-06 NOTE — ASSESSMENT & PLAN NOTE
- Down 3 pounds on Wegovy but patient cannot tolerate side effects  Discussed different ways to decrease severity of side effects but patient would like to stop medication   -  Encouraged healthy diet and exercise to maintain weight loss  - Will continue to monitor

## 2023-06-06 NOTE — PROGRESS NOTES
Name: Alix Swan      : 2000      MRN: [id number]  Encounter Provider: ZOEY Graves  Encounter Date: 2023   Encounter department: [address] [place] [address]  Anxiety  Assessment & Plan:  - Continue Cymbalta 30 mg daily  - Contact office with any worsening anxiety  - Will continue to monitor  2  Class 1 obesity due to excess calories without serious comorbidity with body mass index (BMI) of 33 0 to 33 9 in adult  Assessment & Plan:  - Down 3 pounds on Wegovy but patient cannot tolerate side effects  Discussed different ways to decrease severity of side effects but patient would like to stop medication   -  Encouraged healthy diet and exercise to maintain weight loss  - Will continue to monitor  Subjective     Patient presents today for 1 month follow up for weight management  Currently taking wegovy  She is down 3 pounds  Complains of a lot of side effects including nausea, vomiting, abdominal pain, and diarrhea  She feels like her symptoms are also making her anxiety worse because she keeps worrying if she is going to get nauseous after she eats  Feels the benefits of the medication do not outweigh the cons of her side effects  She would like to stop taking the medication  Denies any other significant concerns or complaints today  Review of Systems   Constitutional: Negative for fatigue and fever  HENT: Negative for trouble swallowing  Eyes: Negative for visual disturbance  Respiratory: Negative for cough and shortness of breath  Cardiovascular: Negative for chest pain and palpitations  Gastrointestinal: Positive for abdominal pain, diarrhea, nausea and vomiting  Negative for blood in stool  Endocrine: Negative for cold intolerance and heat intolerance  Genitourinary: Negative for difficulty urinating and dysuria  Musculoskeletal: Negative for gait problem  Skin: Negative for rash     Neurological: Negative for dizziness, syncope and headaches  Hematological: Negative for adenopathy  Psychiatric/Behavioral: Negative for behavioral problems  The patient is nervous/anxious  Past Medical History:   Diagnosis Date   • Allergic rhinitis    • Asthma    • Environmental allergies    • Lyme disease    • Reflux esophagitis      Past Surgical History:   Procedure Laterality Date   • ADENOIDECTOMY     • LASIK  01/2021   • NASAL SINUS SURGERY     • TONSILLECTOMY  2001   • UPPER GASTROINTESTINAL ENDOSCOPY       Family History   Problem Relation Age of Onset   • Hyperthyroidism Mother    • Clotting disorder Mother    • Diabetes Father    • No Known Problems Sister    • Hyperthyroidism Maternal Grandmother    • No Known Problems Maternal Grandfather    • No Known Problems Paternal Grandmother    • No Known Problems Paternal Grandfather      Social History     Socioeconomic History   • Marital status: Single     Spouse name: None   • Number of children: None   • Years of education: None   • Highest education level: None   Occupational History   • None   Tobacco Use   • Smoking status: Never   • Smokeless tobacco: Never   Vaping Use   • Vaping Use: Never used   Substance and Sexual Activity   • Alcohol use: Yes     Comment: socially    • Drug use: Never   • Sexual activity: Yes     Partners: Male     Birth control/protection: I U D  Other Topics Concern   • None   Social History Narrative    Do you have pets? Dog allowed in bedroom    Are you a smoker? Never    Does anyone smoke in your home? No       Do you live with smokers? No    Travel [place] frequently? No   How many times a year?  N/A      Social Determinants of Health     Financial Resource Strain: Not on file   Food Insecurity: Not on file   Transportation Needs: Not on file   Physical Activity: Not on file   Stress: Not on file   Social Connections: Not on file   Intimate Partner Violence: Not on file   Housing Stability: Not on file     Current Outpatient Medications on File Prior to Visit   Medication Sig   • albuterol (Ventolin HFA) 90 mcg/act inhaler Inhale 2 puffs every 4 (four) hours as needed for wheezing   • amphetamine-dextroamphetamine (ADDERALL) 15 MG tablet Take 15 mg by mouth daily    • cetirizine (ZyrTEC) 10 mg tablet Take 10 mg by mouth as needed for allergies   • DULoxetine (CYMBALTA) 30 mg delayed release capsule Take 1 capsule (30 mg total) by mouth daily   • fluticasone (FLONASE) 50 mcg/act nasal spray 1 spray into each nostril daily as needed for rhinitis   • levonorgestrel (KYLEENA) 19 5 MG intrauterine device 1 Intra Uterine Device by Intrauterine route once   • EPINEPHrine (EPIPEN) 0 3 mg/0 3 mL SOAJ Inject 0 3 mL (0 3 mg total) into a muscle once for 1 dose   • [DISCONTINUED] phentermine (ADIPEX-P) 37 5 MG tablet Take 1 tablet (37 5 mg total) by mouth in the morning (Patient not taking: Reported on 5/9/2023)     Allergies   Allergen Reactions   • Clindamycin/Lincomycin Hives     hives and flushed   • Latex      Immunization History   Administered Date(s) Administered   • HPV9 07/09/2021, 09/10/2021       Objective     /80 (BP Location: Left arm, Patient Position: Sitting, Cuff Size: Standard)   Pulse 94   Temp 99 °F (37 2 °C) (Tympanic)   Resp 16   Ht 5' (1 524 m)   Wt 74 3 kg (163 lb 14 4 oz)   SpO2 97%   BMI 32 01 kg/m²     Physical Exam  Vitals and nursing note reviewed  Constitutional:       General: She is not in acute distress  Appearance: Normal appearance  She is not ill-appearing  HENT:      Head: Normocephalic and atraumatic  Right Ear: External ear normal       Left Ear: External ear normal    Eyes:      Conjunctiva/sclera: Conjunctivae normal    Cardiovascular:      Rate and Rhythm: Normal rate and regular rhythm  Heart sounds: Normal heart sounds  Pulmonary:      Effort: Pulmonary effort is normal       Breath sounds: Normal breath sounds  Musculoskeletal:         General: Normal range of motion  Cervical back: Normal range of motion  Skin:     General: Skin is warm and dry  Neurological:      Mental Status: She is alert and oriented to person, place, and time  Cranial Nerves: No cranial nerve deficit     Psychiatric:         Mood and Affect: Mood normal          Behavior: Behavior normal        ZOEY Bhatt

## 2023-06-06 NOTE — ASSESSMENT & PLAN NOTE
- Continue Cymbalta 30 mg daily  - Contact office with any worsening anxiety  - Will continue to monitor

## 2023-07-12 ENCOUNTER — OFFICE VISIT (OUTPATIENT)
Dept: FAMILY MEDICINE CLINIC | Facility: CLINIC | Age: 23
End: 2023-07-12
Payer: COMMERCIAL

## 2023-07-12 VITALS
SYSTOLIC BLOOD PRESSURE: 124 MMHG | DIASTOLIC BLOOD PRESSURE: 80 MMHG | OXYGEN SATURATION: 98 % | BODY MASS INDEX: 32 KG/M2 | TEMPERATURE: 98 F | HEART RATE: 97 BPM | RESPIRATION RATE: 16 BRPM | HEIGHT: 60 IN | WEIGHT: 163 LBS

## 2023-07-12 DIAGNOSIS — F41.9 ANXIETY: ICD-10-CM

## 2023-07-12 DIAGNOSIS — J45.20 MILD INTERMITTENT ASTHMA WITHOUT COMPLICATION: ICD-10-CM

## 2023-07-12 DIAGNOSIS — R19.7 DIARRHEA, UNSPECIFIED TYPE: Primary | ICD-10-CM

## 2023-07-12 PROCEDURE — 99214 OFFICE O/P EST MOD 30 MIN: CPT | Performed by: NURSE PRACTITIONER

## 2023-07-12 RX ORDER — METRONIDAZOLE 500 MG/1
500 TABLET ORAL EVERY 8 HOURS SCHEDULED
Qty: 21 TABLET | Refills: 0 | Status: SHIPPED | OUTPATIENT
Start: 2023-07-12 | End: 2023-07-19

## 2023-07-12 NOTE — ASSESSMENT & PLAN NOTE
- Well controlled. - Continue Zyrtec and Flonase. - Albuterol inhaler as needed. - Will continue to monitor.

## 2023-07-12 NOTE — PROGRESS NOTES
Name: Ramo Blanco      : 2000      MRN: [id number]  Encounter Provider: ZOEY Martinez  Encounter Date: 2023   Encounter department: [facility]     1. Diarrhea, unspecified type  Assessment & Plan:  - Prescription sent for Flagyl. Advised of side effects.  - Increase oral hydration.  - BRAT diet. - Contact office if symptoms do not improve. Orders:  -     metroNIDAZOLE (FLAGYL) 500 mg tablet; Take 1 tablet (500 mg total) by mouth every 8 (eight) hours for 7 days    2. Anxiety  Assessment & Plan:  - Well controlled on Cymbalta 30 mg daily. Continue same.   - Will continue to monitor. 3. Mild intermittent asthma without complication  Assessment & Plan:  - Well controlled. - Continue Zyrtec and Flonase. - Albuterol inhaler as needed. - Will continue to monitor. Subjective     Patient presents today with complaints of diarrhea and abdominal pain that has been occurring for past few weeks. Denies any fever or chills. Has multiple loose stools per day. Denies any blood in her stool. No nausea or vomiting. No abdominal bloating. Recently traveled to [place]. Does not think she ate any foods that could have caused her symptoms. She has been eating a bland diet and still having diarrhea. She took OTC anti-diarrheal medication which helps for short period but diarrhea returns when she stops taking the medication. Review of Systems   Constitutional: Negative for fatigue and fever. HENT: Negative for trouble swallowing. Eyes: Negative for visual disturbance. Respiratory: Negative for cough and shortness of breath. Cardiovascular: Negative for chest pain and palpitations. Gastrointestinal: Positive for abdominal pain and diarrhea. Negative for blood in stool, constipation, nausea and vomiting. Endocrine: Negative for cold intolerance and heat intolerance.    Genitourinary: Negative for difficulty urinating and dysuria. Musculoskeletal: Negative for gait problem. Skin: Negative for rash. Neurological: Negative for dizziness, syncope and headaches. Hematological: Negative for adenopathy. Psychiatric/Behavioral: Negative for behavioral problems. Past Medical History:   Diagnosis Date   • Allergic rhinitis    • Asthma    • Environmental allergies    • Lyme disease    • Reflux esophagitis      Past Surgical History:   Procedure Laterality Date   • ADENOIDECTOMY     • LASIK  01/2021   • NASAL SINUS SURGERY     • TONSILLECTOMY  2001   • UPPER GASTROINTESTINAL ENDOSCOPY       Family History   Problem Relation Age of Onset   • Hyperthyroidism Mother    • Clotting disorder Mother    • Diabetes Father    • No Known Problems Sister    • Hyperthyroidism Maternal Grandmother    • No Known Problems Maternal Grandfather    • No Known Problems Paternal Grandmother    • No Known Problems Paternal Grandfather      Social History     Socioeconomic History   • Marital status: Single     Spouse name: None   • Number of children: None   • Years of education: None   • Highest education level: None   Occupational History   • None   Tobacco Use   • Smoking status: Never   • Smokeless tobacco: Never   Vaping Use   • Vaping Use: Never used   Substance and Sexual Activity   • Alcohol use: Yes     Comment: socially    • Drug use: Never   • Sexual activity: Yes     Partners: Male     Birth control/protection: I.U.D. Other Topics Concern   • None   Social History Narrative    Do you have pets? Dog allowed in bedroom    Are you a smoker? Never    Does anyone smoke in your home? No       Do you live with smokers? No    Travel [place] frequently? No   How many times a year?  N/A      Social Determinants of Health     Financial Resource Strain: Not on file   Food Insecurity: Not on file   Transportation Needs: Not on file   Physical Activity: Not on file   Stress: Not on file   Social Connections: Not on file   Intimate Partner Violence: Not on file   Housing Stability: Not on file     Current Outpatient Medications on File Prior to Visit   Medication Sig   • albuterol (Ventolin HFA) 90 mcg/act inhaler Inhale 2 puffs every 4 (four) hours as needed for wheezing   • amphetamine-dextroamphetamine (ADDERALL) 15 MG tablet Take 15 mg by mouth daily    • cetirizine (ZyrTEC) 10 mg tablet Take 10 mg by mouth as needed for allergies   • DULoxetine (CYMBALTA) 30 mg delayed release capsule Take 1 capsule (30 mg total) by mouth daily   • EPINEPHrine (EPIPEN) 0.3 mg/0.3 mL SOAJ Inject 0.3 mL (0.3 mg total) into a muscle once for 1 dose   • fluticasone (FLONASE) 50 mcg/act nasal spray 1 spray into each nostril daily as needed for rhinitis   • levonorgestrel (KYLEENA) 19.5 MG intrauterine device 1 Intra Uterine Device by Intrauterine route once     Allergies   Allergen Reactions   • Clindamycin/Lincomycin Hives     hives and flushed   • Latex      Immunization History   Administered Date(s) Administered   • HPV9 07/09/2021, 09/10/2021       Objective     /80 (BP Location: Left arm, Patient Position: Sitting, Cuff Size: Adult)   Pulse 97   Temp 98 °F (36.7 °C) (Tympanic)   Resp 16   Ht 5' (1.524 m)   Wt 73.9 kg (163 lb)   SpO2 98%   BMI 31.83 kg/m²     Physical Exam  Vitals and nursing note reviewed. Constitutional:       Appearance: Normal appearance. HENT:      Head: Normocephalic and atraumatic. Right Ear: External ear normal.      Left Ear: External ear normal.   Eyes:      Conjunctiva/sclera: Conjunctivae normal.   Cardiovascular:      Rate and Rhythm: Normal rate and regular rhythm. Heart sounds: Normal heart sounds. Pulmonary:      Effort: Pulmonary effort is normal.      Breath sounds: Normal breath sounds. Abdominal:      General: Bowel sounds are normal.      Palpations: Abdomen is soft. Tenderness: There is no abdominal tenderness. There is no guarding. Musculoskeletal:         General: Normal range of motion. Cervical back: Normal range of motion. Lymphadenopathy:      Cervical: No cervical adenopathy. Skin:     General: Skin is warm and dry. Neurological:      Mental Status: She is alert and oriented to person, place, and time. Cranial Nerves: No cranial nerve deficit.    Psychiatric:         Mood and Affect: Mood normal.         Behavior: Behavior normal.       ZOEY Cronin

## 2023-07-12 NOTE — ASSESSMENT & PLAN NOTE
- Prescription sent for Flagyl. Advised of side effects.  - Increase oral hydration.  - BRAT diet. - Contact office if symptoms do not improve.

## 2023-09-18 ENCOUNTER — OFFICE VISIT (OUTPATIENT)
Dept: FAMILY MEDICINE CLINIC | Facility: CLINIC | Age: 23
End: 2023-09-18
Payer: COMMERCIAL

## 2023-09-18 VITALS
WEIGHT: 162 LBS | HEIGHT: 60 IN | SYSTOLIC BLOOD PRESSURE: 120 MMHG | BODY MASS INDEX: 31.8 KG/M2 | HEART RATE: 88 BPM | TEMPERATURE: 98.1 F | RESPIRATION RATE: 16 BRPM | OXYGEN SATURATION: 97 % | DIASTOLIC BLOOD PRESSURE: 84 MMHG

## 2023-09-18 DIAGNOSIS — R19.7 DIARRHEA, UNSPECIFIED TYPE: Primary | ICD-10-CM

## 2023-09-18 DIAGNOSIS — R14.0 ABDOMINAL BLOATING: ICD-10-CM

## 2023-09-18 DIAGNOSIS — F41.9 ANXIETY: ICD-10-CM

## 2023-09-18 PROCEDURE — 99214 OFFICE O/P EST MOD 30 MIN: CPT | Performed by: NURSE PRACTITIONER

## 2023-09-18 RX ORDER — DICYCLOMINE HYDROCHLORIDE 10 MG/1
10 CAPSULE ORAL
Qty: 120 CAPSULE | Refills: 1 | Status: SHIPPED | OUTPATIENT
Start: 2023-09-18 | End: 2023-10-18

## 2023-09-18 NOTE — ASSESSMENT & PLAN NOTE
- Not improving.   - Discussed elimination diet. - Increase fiber intake. - Referred to GI for further evaluation.

## 2023-09-18 NOTE — PROGRESS NOTES
Name: Gabrielle Duncan      : 2000      MRN: [id number]  Encounter Provider: ZOEY Perez  Encounter Date: 2023   Encounter department: [facility]     1. Diarrhea, unspecified type  Assessment & Plan:  - Not improving.   - Discussed elimination diet. - Increase fiber intake. - Referred to GI for further evaluation. Orders:  -     Ambulatory Referral to Gastroenterology; Future    2. Abdominal bloating  Assessment & Plan:  - Prescription sent for Bentyl.   - Referred to GI for further evaluation. Orders:  -     Ambulatory Referral to Gastroenterology; Future  -     dicyclomine (BENTYL) 10 mg capsule; Take 1 capsule (10 mg total) by mouth 4 (four) times a day (before meals and at bedtime)    3. Anxiety  Assessment & Plan:  - Well controlled on Cymbalta 30 mg daily. Continue same.   - Will continue to monitor. Subjective     Patient presents to office today with complaints of abdominal pain and diarrhea. Has been occurring for the past few months. She was treated with Flagyl in  which improved symptoms for only 1-2 weeks. She states that she has diarrhea about 4 days per week. She has 3-4 bowel movements per day. Denies any blood in the stool. She has not been able to tell if certain foods make her symptoms worse but she did have an episode of diarrhea after eating clam chowder last week. She eats other forms or dairy with no problem. She does have occasional bilateral lower abdominal pain and bloating. Denies any fever or chills. States diarrhea sometimes wakes her in the middle of the night. Is also worse during period of increased anxiety. Review of Systems   Constitutional: Negative for fatigue and fever. HENT: Negative for trouble swallowing. Eyes: Negative for visual disturbance. Respiratory: Negative for cough and shortness of breath. Cardiovascular: Negative for chest pain and palpitations. Gastrointestinal: Positive for abdominal distention, abdominal pain and diarrhea. Negative for blood in stool and constipation. Endocrine: Negative for cold intolerance and heat intolerance. Genitourinary: Negative for difficulty urinating and dysuria. Musculoskeletal: Negative for gait problem. Skin: Negative for rash. Neurological: Negative for dizziness, syncope and headaches. Hematological: Negative for adenopathy. Psychiatric/Behavioral: Negative for behavioral problems. Past Medical History:   Diagnosis Date   • Allergic rhinitis    • Asthma    • Environmental allergies    • Lyme disease    • Reflux esophagitis      Past Surgical History:   Procedure Laterality Date   • ADENOIDECTOMY     • LASIK  01/2021   • NASAL SINUS SURGERY     • TONSILLECTOMY  2001   • UPPER GASTROINTESTINAL ENDOSCOPY       Family History   Problem Relation Age of Onset   • Hyperthyroidism Mother    • Clotting disorder Mother    • Diabetes Father    • No Known Problems Sister    • Hyperthyroidism Maternal Grandmother    • No Known Problems Maternal Grandfather    • No Known Problems Paternal Grandmother    • No Known Problems Paternal Grandfather      Social History     Socioeconomic History   • Marital status: Single     Spouse name: None   • Number of children: None   • Years of education: None   • Highest education level: None   Occupational History   • None   Tobacco Use   • Smoking status: Never   • Smokeless tobacco: Never   Vaping Use   • Vaping Use: Never used   Substance and Sexual Activity   • Alcohol use: Yes     Comment: socially    • Drug use: Never   • Sexual activity: Yes     Partners: Male     Birth control/protection: I.U.D. Other Topics Concern   • None   Social History Narrative    Do you have pets? Dog allowed in bedroom    Are you a smoker? Never    Does anyone smoke in your home? No       Do you live with smokers? No    Travel [place] frequently? No   How many times a year?  N/A      Social Determinants of Health     Financial Resource Strain: Not on file   Food Insecurity: Not on file   Transportation Needs: Not on file   Physical Activity: Not on file   Stress: Not on file   Social Connections: Not on file   Intimate Partner Violence: Not on file   Housing Stability: Not on file     Current Outpatient Medications on File Prior to Visit   Medication Sig   • albuterol (Ventolin HFA) 90 mcg/act inhaler Inhale 2 puffs every 4 (four) hours as needed for wheezing   • amphetamine-dextroamphetamine (ADDERALL) 15 MG tablet Take 15 mg by mouth daily    • cetirizine (ZyrTEC) 10 mg tablet Take 10 mg by mouth as needed for allergies   • fluticasone (FLONASE) 50 mcg/act nasal spray 1 spray into each nostril daily as needed for rhinitis   • levonorgestrel (KYLEENA) 19.5 MG intrauterine device 1 Intra Uterine Device by Intrauterine route once   • DULoxetine (CYMBALTA) 30 mg delayed release capsule Take 1 capsule (30 mg total) by mouth daily   • EPINEPHrine (EPIPEN) 0.3 mg/0.3 mL SOAJ Inject 0.3 mL (0.3 mg total) into a muscle once for 1 dose     Allergies   Allergen Reactions   • Clindamycin/Lincomycin Hives     hives and flushed   • Latex      Immunization History   Administered Date(s) Administered   • HPV9 07/09/2021, 09/10/2021       Objective     /84 (BP Location: Left arm, Patient Position: Sitting, Cuff Size: Standard)   Pulse 88   Temp 98.1 °F (36.7 °C) (Tympanic)   Resp 16   Ht 5' (1.524 m)   Wt 73.5 kg (162 lb)   SpO2 97%   BMI 31.64 kg/m²     Physical Exam  Vitals and nursing note reviewed. Constitutional:       General: She is not in acute distress. Appearance: Normal appearance. She is not ill-appearing. HENT:      Head: Normocephalic and atraumatic. Right Ear: External ear normal.      Left Ear: External ear normal.   Eyes:      Conjunctiva/sclera: Conjunctivae normal.   Cardiovascular:      Rate and Rhythm: Normal rate and regular rhythm.       Heart sounds: Normal heart sounds. Pulmonary:      Effort: Pulmonary effort is normal.      Breath sounds: Normal breath sounds. Abdominal:      General: Bowel sounds are normal. There is no distension. Palpations: Abdomen is soft. Tenderness: There is no abdominal tenderness. There is no guarding. Musculoskeletal:         General: Normal range of motion. Cervical back: Normal range of motion. Skin:     General: Skin is warm and dry. Neurological:      Mental Status: She is alert and oriented to person, place, and time.    Psychiatric:         Mood and Affect: Mood normal.         Behavior: Behavior normal.       ZOEY Calvillo

## 2023-09-25 ENCOUNTER — OFFICE VISIT (OUTPATIENT)
Dept: GASTROENTEROLOGY | Facility: MEDICAL CENTER | Age: 23
End: 2023-09-25
Payer: COMMERCIAL

## 2023-09-25 VITALS
DIASTOLIC BLOOD PRESSURE: 80 MMHG | SYSTOLIC BLOOD PRESSURE: 126 MMHG | HEART RATE: 64 BPM | TEMPERATURE: 98.2 F | BODY MASS INDEX: 31.48 KG/M2 | WEIGHT: 161.2 LBS

## 2023-09-25 DIAGNOSIS — R10.30 LOWER ABDOMINAL PAIN: ICD-10-CM

## 2023-09-25 DIAGNOSIS — R19.7 DIARRHEA, UNSPECIFIED TYPE: Primary | ICD-10-CM

## 2023-09-25 DIAGNOSIS — R14.0 ABDOMINAL BLOATING: ICD-10-CM

## 2023-09-25 PROCEDURE — 99203 OFFICE O/P NEW LOW 30 MIN: CPT | Performed by: NURSE PRACTITIONER

## 2023-09-25 NOTE — PROGRESS NOTES
[facility] - Outpatient Consultation  Graeme Andrews 24 y.o. female MRN: [id number]  Encounter: [id number]          ASSESSMENT AND PLAN:    Graeme Andrews is a 24 y.o. female who presents with complaint of diarrhea, lower abdominal pain and bloating. 1. Abdominal bloating  2. Diarrhea, unspecified type  3. Lower abdominal pain      Started approximately 6 months ago with sudden change in bowel pattern. Stools were brown and formed daily. Now stools are loose, [place] stool scale 7 approximately 3 times a week. Patient cannot pinpoint triggers. She may have 3-4 loose stools on her days of diarrhea. Denies any melena hematochezia. No new medications, travel or herbal use. No skin rash or frequent eye infections. No family history of any colon cancers, inflammatory bowel disease or polyps. She does have an aunt with celiac disease. No correlation with dairy products. No weight loss. She does have intermittent lower abdominal cramping that is better with a BM. Can awaken her at night at times. She started Bentyl last week and 10 mg 4 times daily and pain is now resolved as well as stools are less frequent. Will rule out celiac disease, H. pylori, infection versus inflammatory. We will hold on colonoscopy at this time until labs and stools are complete. Patient has never had a colonoscopy. -CBC, CMP, TSH, celiac panel  -Stool for fecal calprotectin, C. difficile, enteric pathogens, ova and parasite, Giardia/Cryptosporidium and H. Pylori  -Continue Bentyl 10 mg 4 times daily as needed  -Add fiber supplement daily  -Follow-up in office after testing to consider possible colonoscopy      ______________________________________________________________________    HPI:    Graeme Andrews is a 24 y.o. female who presents with complaint of diarrhea, lower abdominal pain and bloating. Has a past medical history of asthma and anxiety.   Started approximately 6 months ago with sudden change in bowel pattern. Stools were brown and formed daily. Now stools are loose, [place] stool scale 7 approximately 3 times a week. Patient cannot pinpoint triggers. She may have 3-4 loose stools on her days of diarrhea. Denies any melena hematochezia. No new medications, travel or herbal use. No skin rash or frequent eye infections. No family history of any colon cancers, inflammatory bowel disease or polyps. She does have an aunt with celiac disease. No correlation with dairy products. No weight loss. She does have intermittent lower abdominal cramping that is better with a BM. Can awaken her at night at times. She started Bentyl last week and 10 mg 4 times daily and pain is now resolved as well as stools are less frequent. Labs 3/23 CBC, CMP and TSH were normal.    She did have a history of GERD many years ago. Had an EGD in 2017 which noted a small hiatal hernia per patient. Her reflux is now controlled with diet. Denies any nausea, vomiting or dysphagia. REVIEW OF SYSTEMS:    CONSTITUTIONAL: Denies any fever, chills, rigors, and weight loss. HEENT: No earache or tinnitus. Denies hearing loss or visual disturbances. CARDIOVASCULAR: No chest pain or palpitations. RESPIRATORY: Denies any cough, hemoptysis, shortness of breath or dyspnea on exertion. GASTROINTESTINAL: As noted in the History of Present Illness. GENITOURINARY: No problems with urination. Denies any hematuria or dysuria. NEUROLOGIC: No dizziness or vertigo, denies headaches. MUSCULOSKELETAL: Denies any muscle or joint pain. SKIN: Denies skin rashes or itching. ENDOCRINE: Denies excessive thirst. Denies intolerance to heat or cold. PSYCHOSOCIAL: Denies depression or anxiety. Denies any recent memory loss.        Historical Information   Past Medical History:   Diagnosis Date   • Allergic rhinitis    • Asthma    • Environmental allergies    • Lyme disease    • Reflux esophagitis      Past Surgical History:   Procedure Laterality Date   • ADENOIDECTOMY     • LASIK  01/2021   • NASAL SINUS SURGERY     • TONSILLECTOMY  2001   • UPPER GASTROINTESTINAL ENDOSCOPY       Social History   Social History     Substance and Sexual Activity   Alcohol Use Yes    Comment: socially      Social History     Substance and Sexual Activity   Drug Use Never     Social History     Tobacco Use   Smoking Status Never   Smokeless Tobacco Never     Family History   Problem Relation Age of Onset   • Hyperthyroidism Mother    • Clotting disorder Mother    • Diabetes Father    • No Known Problems Sister    • Hyperthyroidism Maternal Grandmother    • No Known Problems Maternal Grandfather    • No Known Problems Paternal Grandmother    • No Known Problems Paternal Grandfather        Meds/Allergies       Current Outpatient Medications:   •  albuterol (Ventolin HFA) 90 mcg/act inhaler  •  amphetamine-dextroamphetamine (ADDERALL) 15 MG tablet  •  cetirizine (ZyrTEC) 10 mg tablet  •  dicyclomine (BENTYL) 10 mg capsule  •  fluticasone (FLONASE) 50 mcg/act nasal spray  •  levonorgestrel (KYLEENA) 19.5 MG intrauterine device  •  DULoxetine (CYMBALTA) 30 mg delayed release capsule  •  EPINEPHrine (EPIPEN) 0.3 mg/0.3 mL SOAJ    Allergies   Allergen Reactions   • Clindamycin/Lincomycin Hives     hives and flushed   • Latex            Objective     Blood pressure 126/80, pulse 64, temperature 98.2 °F (36.8 °C), weight 73.1 kg (161 lb 3.2 oz). Body mass index is 31.48 kg/m². PHYSICAL EXAM:      General Appearance:   Alert, cooperative, no distress   HEENT:   Normocephalic, atraumatic, anicteric. Neck:  Supple, symmetrical, trachea midline   Lungs:   Clear to auscultation bilaterally; no rales, rhonchi or wheezing; respirations unlabored    Heart[de-identified]   Regular rate and rhythm; no murmur, rub, or gallop.    Abdomen:   Soft, non-tender, non-distended; normal bowel sounds; no masses, no organomegaly    Genitalia:   Deferred    Rectal:   Deferred    Extremities:  No cyanosis, clubbing or edema    Pulses:  2+ and symmetric    Skin:  No jaundice, rashes, or lesions    Lymph nodes:  No palpable cervical lymphadenopathy        Lab Results:   No visits with results within 1 Day(s) from this visit.    Latest known visit with results is:   Appointment on 03/07/2023   Component Date Value   • WBC 03/07/2023 5.25    • RBC 03/07/2023 4.55    • Hemoglobin 03/07/2023 13.8    • Hematocrit 03/07/2023 41.6    • MCV 03/07/2023 91    • MCH 03/07/2023 30.3    • MCHC 03/07/2023 33.2    • RDW 03/07/2023 12.0    • MPV 03/07/2023 11.0    • Platelets 16/05/7942 345    • nRBC 03/07/2023 0    • Neutrophils Relative 03/07/2023 45    • Immat GRANS % 03/07/2023 0    • Lymphocytes Relative 03/07/2023 37    • Monocytes Relative 03/07/2023 12    • Eosinophils Relative 03/07/2023 4    • Basophils Relative 03/07/2023 2 (H)    • Neutrophils Absolute 03/07/2023 2.35    • Immature Grans Absolute 03/07/2023 0.01    • Lymphocytes Absolute 03/07/2023 1.95    • Monocytes Absolute 03/07/2023 0.64    • Eosinophils Absolute 03/07/2023 0.21    • Basophils Absolute 03/07/2023 0.09    • Sodium 03/07/2023 135    • Potassium 03/07/2023 3.8    • Chloride 03/07/2023 103    • CO2 03/07/2023 26    • ANION GAP 03/07/2023 6    • BUN 03/07/2023 14    • Creatinine 03/07/2023 0.58 (L)    • Glucose, Fasting 03/07/2023 93    • Calcium 03/07/2023 9.3    • AST 03/07/2023 24    • ALT 03/07/2023 43    • Alkaline Phosphatase 03/07/2023 71    • Total Protein 03/07/2023 7.6    • Albumin 03/07/2023 4.2    • Total Bilirubin 03/07/2023 0.43    • eGFR 03/07/2023 129    • Cholesterol 03/07/2023 160    • Triglycerides 03/07/2023 127    • HDL, Direct 03/07/2023 48 (L)    • LDL Calculated 03/07/2023 87    • TSH 3RD GENERATON 03/07/2023 2.120    • Hemoglobin A1C 03/07/2023 5.0    • EAG 03/07/2023 97    • Insulin, Fasting 03/07/2023 12.5        Lab Results   Component Value Date    WBC 5.25 03/07/2023    HGB 13.8 03/07/2023    HCT 41.6 03/07/2023    MCV 91 03/07/2023     03/07/2023       Lab Results   Component Value Date    SODIUM 135 03/07/2023    K 3.8 03/07/2023     03/07/2023    CO2 26 03/07/2023    AGAP 6 03/07/2023    BUN 14 03/07/2023    CREATININE 0.58 (L) 03/07/2023    GLUF 93 03/07/2023    CALCIUM 9.3 03/07/2023    AST 24 03/07/2023    ALT 43 03/07/2023    ALKPHOS 71 03/07/2023    TP 7.6 03/07/2023    TBILI 0.43 03/07/2023    EGFR 129 03/07/2023       No results found for: "CRP"    Lab Results   Component Value Date    [id number] 2.120 03/07/2023       No results found for: "IRON", "TIBC", "FERRITIN"    Radiology Results:   No results found.

## 2023-09-26 ENCOUNTER — APPOINTMENT (OUTPATIENT)
Dept: LAB | Age: 23
End: 2023-09-26
Payer: COMMERCIAL

## 2023-09-26 DIAGNOSIS — R14.0 ABDOMINAL BLOATING: ICD-10-CM

## 2023-09-26 DIAGNOSIS — R10.30 LOWER ABDOMINAL PAIN: ICD-10-CM

## 2023-09-26 DIAGNOSIS — R19.7 DIARRHEA, UNSPECIFIED TYPE: ICD-10-CM

## 2023-09-26 LAB
ALBUMIN SERPL BCP-MCNC: 4.4 G/DL (ref 3.5–5)
ALP SERPL-CCNC: 61 U/L (ref 34–104)
ALT SERPL W P-5'-P-CCNC: 20 U/L (ref 7–52)
ANION GAP SERPL CALCULATED.3IONS-SCNC: 9 MMOL/L
AST SERPL W P-5'-P-CCNC: 19 U/L (ref 13–39)
BASOPHILS # BLD AUTO: 0.07 THOUSANDS/ÂΜL (ref 0–0.1)
BASOPHILS NFR BLD AUTO: 1 % (ref 0–1)
BILIRUB SERPL-MCNC: 0.45 MG/DL (ref 0.2–1)
BUN SERPL-MCNC: 13 MG/DL (ref 5–25)
CALCIUM SERPL-MCNC: 9.1 MG/DL (ref 8.4–10.2)
CHLORIDE SERPL-SCNC: 104 MMOL/L (ref 96–108)
CO2 SERPL-SCNC: 25 MMOL/L (ref 21–32)
CREAT SERPL-MCNC: 0.65 MG/DL (ref 0.6–1.3)
EOSINOPHIL # BLD AUTO: 0.14 THOUSAND/ÂΜL (ref 0–0.61)
EOSINOPHIL NFR BLD AUTO: 2 % (ref 0–6)
ERYTHROCYTE [DISTWIDTH] IN BLOOD BY AUTOMATED COUNT: 11.9 % (ref 11.6–15.1)
GFR SERPL CREATININE-BSD FRML MDRD: 124 ML/MIN/1.73SQ M
GLUCOSE P FAST SERPL-MCNC: 89 MG/DL (ref 65–99)
HCT VFR BLD AUTO: 40.1 % (ref 34.8–46.1)
HGB BLD-MCNC: 13.7 G/DL (ref 11.5–15.4)
IGA SERPL-MCNC: 186 MG/DL (ref 66–433)
IMM GRANULOCYTES # BLD AUTO: 0.01 THOUSAND/UL (ref 0–0.2)
IMM GRANULOCYTES NFR BLD AUTO: 0 % (ref 0–2)
LYMPHOCYTES # BLD AUTO: 2.38 THOUSANDS/ÂΜL (ref 0.6–4.47)
LYMPHOCYTES NFR BLD AUTO: 38 % (ref 14–44)
MCH RBC QN AUTO: 30.6 PG (ref 26.8–34.3)
MCHC RBC AUTO-ENTMCNC: 34.2 G/DL (ref 31.4–37.4)
MCV RBC AUTO: 90 FL (ref 82–98)
MONOCYTES # BLD AUTO: 0.75 THOUSAND/ÂΜL (ref 0.17–1.22)
MONOCYTES NFR BLD AUTO: 12 % (ref 4–12)
NEUTROPHILS # BLD AUTO: 2.96 THOUSANDS/ÂΜL (ref 1.85–7.62)
NEUTS SEG NFR BLD AUTO: 47 % (ref 43–75)
NRBC BLD AUTO-RTO: 0 /100 WBCS
PLATELET # BLD AUTO: 315 THOUSANDS/UL (ref 149–390)
PMV BLD AUTO: 11.2 FL (ref 8.9–12.7)
POTASSIUM SERPL-SCNC: 3.9 MMOL/L (ref 3.5–5.3)
PROT SERPL-MCNC: 7.1 G/DL (ref 6.4–8.4)
RBC # BLD AUTO: 4.47 MILLION/UL (ref 3.81–5.12)
SODIUM SERPL-SCNC: 138 MMOL/L (ref 135–147)
TSH SERPL DL<=0.05 MIU/L-ACNC: 1.77 UIU/ML (ref 0.45–4.5)
WBC # BLD AUTO: 6.31 THOUSAND/UL (ref 4.31–10.16)

## 2023-09-26 PROCEDURE — 80053 COMPREHEN METABOLIC PANEL: CPT

## 2023-09-26 PROCEDURE — 85025 COMPLETE CBC W/AUTO DIFF WBC: CPT

## 2023-09-26 PROCEDURE — 86364 TISS TRNSGLTMNASE EA IG CLAS: CPT

## 2023-09-26 PROCEDURE — 36415 COLL VENOUS BLD VENIPUNCTURE: CPT

## 2023-09-26 PROCEDURE — 82784 ASSAY IGA/IGD/IGG/IGM EACH: CPT

## 2023-09-26 PROCEDURE — 84443 ASSAY THYROID STIM HORMONE: CPT

## 2023-09-27 LAB — TTG IGA SER-ACNC: <2 U/ML (ref 0–3)

## 2023-09-28 ENCOUNTER — APPOINTMENT (OUTPATIENT)
Dept: LAB | Age: 23
End: 2023-09-28
Payer: COMMERCIAL

## 2023-09-28 DIAGNOSIS — R19.7 DIARRHEA, UNSPECIFIED TYPE: ICD-10-CM

## 2023-09-28 DIAGNOSIS — R10.30 LOWER ABDOMINAL PAIN: ICD-10-CM

## 2023-09-28 DIAGNOSIS — R14.0 ABDOMINAL BLOATING: ICD-10-CM

## 2023-09-28 PROCEDURE — 87505 NFCT AGENT DETECTION GI: CPT

## 2023-09-28 PROCEDURE — 87177 OVA AND PARASITES SMEARS: CPT

## 2023-09-28 PROCEDURE — 87329 GIARDIA AG IA: CPT

## 2023-09-28 PROCEDURE — 87209 SMEAR COMPLEX STAIN: CPT

## 2023-09-28 PROCEDURE — 83993 ASSAY FOR CALPROTECTIN FECAL: CPT

## 2023-09-28 PROCEDURE — 87338 HPYLORI STOOL AG IA: CPT

## 2023-09-29 ENCOUNTER — TELEPHONE (OUTPATIENT)
Dept: GASTROENTEROLOGY | Facility: CLINIC | Age: 23
End: 2023-09-29

## 2023-09-29 LAB
CAMPYLOBACTER DNA SPEC NAA+PROBE: NORMAL
G LAMBLIA AG STL QL IA: NEGATIVE
H PYLORI AG STL QL IA: NEGATIVE
SALMONELLA DNA SPEC QL NAA+PROBE: NORMAL
SHIGA TOXIN STX GENE SPEC NAA+PROBE: NORMAL
SHIGELLA DNA SPEC QL NAA+PROBE: NORMAL

## 2023-09-29 NOTE — TELEPHONE ENCOUNTER
Patients GI provider:  Karlene Underwood    Number to return call: [phone]    Reason for call:  Lab outreach called stating stool study test was canceled due to formed stool. Test was not able to be preformed.      Scheduled procedure/appointment date if applicable:

## 2023-09-29 NOTE — TELEPHONE ENCOUNTER
Called and spoke to Pt that stool test was formed and could not be processed which is why another stool sample would be needed. Pt stated understanding and had no further questions. Thank you!

## 2023-09-30 LAB — CALPROTECTIN STL-MCNT: 7 UG/G (ref 0–120)

## 2023-11-15 DIAGNOSIS — F41.9 ANXIETY: ICD-10-CM

## 2023-11-15 DIAGNOSIS — R14.0 ABDOMINAL BLOATING: ICD-10-CM

## 2023-11-15 RX ORDER — DICYCLOMINE HYDROCHLORIDE 10 MG/1
CAPSULE ORAL
Qty: 120 CAPSULE | Refills: 1 | Status: SHIPPED | OUTPATIENT
Start: 2023-11-15

## 2023-11-15 RX ORDER — DULOXETIN HYDROCHLORIDE 30 MG/1
30 CAPSULE, DELAYED RELEASE ORAL DAILY
Qty: 90 CAPSULE | Refills: 1 | Status: SHIPPED | OUTPATIENT
Start: 2023-11-15 | End: 2023-11-17 | Stop reason: SDUPTHER

## 2023-11-17 ENCOUNTER — OFFICE VISIT (OUTPATIENT)
Dept: FAMILY MEDICINE CLINIC | Facility: CLINIC | Age: 23
End: 2023-11-17
Payer: COMMERCIAL

## 2023-11-17 VITALS
TEMPERATURE: 97.8 F | WEIGHT: 161 LBS | BODY MASS INDEX: 31.61 KG/M2 | DIASTOLIC BLOOD PRESSURE: 80 MMHG | SYSTOLIC BLOOD PRESSURE: 124 MMHG | HEIGHT: 60 IN | OXYGEN SATURATION: 97 % | RESPIRATION RATE: 16 BRPM | HEART RATE: 92 BPM

## 2023-11-17 DIAGNOSIS — R19.7 DIARRHEA, UNSPECIFIED TYPE: ICD-10-CM

## 2023-11-17 DIAGNOSIS — F41.9 ANXIETY: Primary | ICD-10-CM

## 2023-11-17 DIAGNOSIS — J45.20 MILD INTERMITTENT ASTHMA WITHOUT COMPLICATION: ICD-10-CM

## 2023-11-17 PROBLEM — F98.8 ATTENTION DEFICIT DISORDER: Status: ACTIVE | Noted: 2023-11-17

## 2023-11-17 PROBLEM — F98.8 ATTENTION DEFICIT DISORDER: Status: RESOLVED | Noted: 2023-11-17 | Resolved: 2023-11-17

## 2023-11-17 PROCEDURE — 99214 OFFICE O/P EST MOD 30 MIN: CPT | Performed by: NURSE PRACTITIONER

## 2023-11-17 RX ORDER — DULOXETIN HYDROCHLORIDE 30 MG/1
30 CAPSULE, DELAYED RELEASE ORAL DAILY
Qty: 90 CAPSULE | Refills: 1 | Status: SHIPPED | OUTPATIENT
Start: 2023-11-17

## 2023-11-17 NOTE — PROGRESS NOTES
Name: Loraine Snyder      : 2000      MRN: [id number]  Encounter Provider: ZOEY Chaudhary  Encounter Date: 2023   Encounter department: [facility]     1. Anxiety  Assessment & Plan:  - Well controlled on Cymbalta 30 mg daily. Continue same.   - Will continue to monitor. Orders:  -     DULoxetine (CYMBALTA) 30 mg delayed release capsule; Take 1 capsule (30 mg total) by mouth daily    2. Diarrhea, unspecified type  Assessment & Plan:  - Somewhat improved. - Seen by GI. Stool testing was negative. She is scheduling colonoscopy. - Continue Bentyl and high fiber diet. - Will continue to monitor. 3. Mild intermittent asthma without complication  Assessment & Plan:  - Well controlled. - Continue Zyrtec and Flonase. - Albuterol inhaler as needed. - Will continue to monitor. Subjective     Patient with PMH of anxiety presents to office today for routine follow up. She is taking her prescribed medication and reports no side effects. She is seeing GI for diarrhea. She was put on Bentyl and told to increase fiber intake. Her stool testing was negative. She is going to schedule a colonoscopy. She denies any other concerns or complaints today. Review of Systems   Constitutional:  Negative for fatigue and fever. HENT:  Negative for trouble swallowing. Eyes:  Negative for visual disturbance. Respiratory:  Negative for cough and shortness of breath. Cardiovascular:  Negative for chest pain and palpitations. Gastrointestinal:  Negative for abdominal pain and blood in stool. Endocrine: Negative for cold intolerance and heat intolerance. Genitourinary:  Negative for difficulty urinating and dysuria. Musculoskeletal:  Negative for gait problem. Skin:  Negative for rash. Neurological:  Negative for dizziness, syncope and headaches. Hematological:  Negative for adenopathy.    Psychiatric/Behavioral:  Negative for behavioral problems. Past Medical History:   Diagnosis Date   • Allergic rhinitis    • Asthma    • Environmental allergies    • Lyme disease    • Reflux esophagitis      Past Surgical History:   Procedure Laterality Date   • ADENOIDECTOMY     • LASIK  01/2021   • NASAL SINUS SURGERY     • TONSILLECTOMY  2001   • UPPER GASTROINTESTINAL ENDOSCOPY       Family History   Problem Relation Age of Onset   • Hyperthyroidism Mother    • Clotting disorder Mother    • Diabetes Father    • No Known Problems Sister    • Hyperthyroidism Maternal Grandmother    • No Known Problems Maternal Grandfather    • No Known Problems Paternal Grandmother    • No Known Problems Paternal Grandfather      Social History     Socioeconomic History   • Marital status: Single     Spouse name: None   • Number of children: None   • Years of education: None   • Highest education level: None   Occupational History   • None   Tobacco Use   • Smoking status: Never   • Smokeless tobacco: Never   Vaping Use   • Vaping Use: Never used   Substance and Sexual Activity   • Alcohol use: Yes     Comment: socially    • Drug use: Never   • Sexual activity: Yes     Partners: Male     Birth control/protection: I.U.D. Other Topics Concern   • None   Social History Narrative    Do you have pets? Dog allowed in bedroom    Are you a smoker? Never    Does anyone smoke in your home? No       Do you live with smokers? No    Travel [place] frequently? No   How many times a year?  N/A      Social Determinants of Health     Financial Resource Strain: Not on file   Food Insecurity: Not on file   Transportation Needs: Not on file   Physical Activity: Not on file   Stress: Not on file   Social Connections: Not on file   Intimate Partner Violence: Not on file   Housing Stability: Not on file     Current Outpatient Medications on File Prior to Visit   Medication Sig   • albuterol (Ventolin HFA) 90 mcg/act inhaler Inhale 2 puffs every 4 (four) hours as needed for wheezing   • amphetamine-dextroamphetamine (ADDERALL) 15 MG tablet Take 15 mg by mouth daily    • cetirizine (ZyrTEC) 10 mg tablet Take 10 mg by mouth as needed for allergies   • dicyclomine (BENTYL) 10 mg capsule take 1 capsule by mouth four times a day before MEALS AND AT BEDTIME   • EPINEPHrine (EPIPEN) 0.3 mg/0.3 mL SOAJ Inject 0.3 mL (0.3 mg total) into a muscle once for 1 dose (Patient taking differently: Inject 0.3 mg into a muscle once As needed)   • fluticasone (FLONASE) 50 mcg/act nasal spray 1 spray into each nostril daily as needed for rhinitis   • levonorgestrel (KYLEENA) 19.5 MG intrauterine device 1 Intra Uterine Device by Intrauterine route once   • [DISCONTINUED] DULoxetine (CYMBALTA) 30 mg delayed release capsule take 1 capsule by mouth once daily     Allergies   Allergen Reactions   • Clindamycin/Lincomycin Hives     hives and flushed   • Latex      Immunization History   Administered Date(s) Administered   • HPV9 07/09/2021, 09/10/2021       Objective     /80 (BP Location: Left arm, Patient Position: Sitting, Cuff Size: Adult)   Pulse 92   Temp 97.8 °F (36.6 °C) (Tympanic)   Resp 16   Ht 5' (1.524 m)   Wt 73 kg (161 lb)   SpO2 97%   BMI 31.44 kg/m²     Physical Exam  Vitals and nursing note reviewed. Constitutional:       General: She is not in acute distress. Appearance: Normal appearance. She is not ill-appearing. HENT:      Head: Normocephalic and atraumatic. Right Ear: External ear normal.      Left Ear: External ear normal.   Eyes:      Conjunctiva/sclera: Conjunctivae normal.   Cardiovascular:      Rate and Rhythm: Normal rate and regular rhythm. Heart sounds: Normal heart sounds. Pulmonary:      Effort: Pulmonary effort is normal.      Breath sounds: Normal breath sounds. Musculoskeletal:         General: Normal range of motion. Cervical back: Normal range of motion. Skin:     General: Skin is warm and dry.    Neurological:      Mental Status: She is alert and oriented to person, place, and time.    Psychiatric:         Mood and Affect: Mood normal.         Behavior: Behavior normal.       ZOEY Alford

## 2023-11-17 NOTE — ASSESSMENT & PLAN NOTE
- Somewhat improved. - Seen by GI. Stool testing was negative. She is scheduling colonoscopy. - Continue Bentyl and high fiber diet. - Will continue to monitor.

## 2023-11-29 ENCOUNTER — TELEPHONE (OUTPATIENT)
Dept: GASTROENTEROLOGY | Facility: MEDICAL CENTER | Age: 23
End: 2023-11-29

## 2023-11-29 ENCOUNTER — OFFICE VISIT (OUTPATIENT)
Dept: GASTROENTEROLOGY | Facility: MEDICAL CENTER | Age: 23
End: 2023-11-29
Payer: COMMERCIAL

## 2023-11-29 VITALS
OXYGEN SATURATION: 98 % | TEMPERATURE: 99 F | HEART RATE: 91 BPM | DIASTOLIC BLOOD PRESSURE: 87 MMHG | SYSTOLIC BLOOD PRESSURE: 116 MMHG | WEIGHT: 160.4 LBS | HEIGHT: 60 IN | BODY MASS INDEX: 31.49 KG/M2

## 2023-11-29 DIAGNOSIS — R10.30 LOWER ABDOMINAL PAIN: Primary | ICD-10-CM

## 2023-11-29 PROCEDURE — 99213 OFFICE O/P EST LOW 20 MIN: CPT | Performed by: NURSE PRACTITIONER

## 2023-11-29 RX ORDER — POLYETHYLENE GLYCOL 3350, SODIUM SULFATE ANHYDROUS, SODIUM BICARBONATE, SODIUM CHLORIDE, POTASSIUM CHLORIDE 236; 22.74; 6.74; 5.86; 2.97 G/4L; G/4L; G/4L; G/4L; G/4L
4000 POWDER, FOR SOLUTION ORAL ONCE
Qty: 4000 ML | Refills: 0 | Status: SHIPPED | OUTPATIENT
Start: 2023-11-29 | End: 2023-11-29

## 2023-11-29 NOTE — PROGRESS NOTES
[facility] - Outpatient Follow-up Note  Graeme Andrews 24 y.o. female MRN: [id number]  Encounter: [id number]          ASSESSMENT AND PLAN:      1. Diarrhea  2. Lower abdominal pain    History of lower abdominal cramping left lower quadrant greater than right lower quadrant for several months. Recently worsened. It is associated with loose stools. Pain is improved with Bentyl 10 mg 4 times daily but causes constipation for patient. If she takes less Bentyl than it does not help her abdominal cramping. She cannot pinpoint triggers. Occasionally pain is better with a BM. Stools are loose 2-3 times per day and can awaken her at night as well as the pain can awaken her at night. No weight loss. Never had a colonoscopy. Stool studies and celiac panel were negative. Fecal calprotectin was normal.  CBC, CMP and TSH are normal.  Stool for H. pylori was negative. She has not been to the GYN recently. I did recommend that she make a follow-up appointment with them. -Colonoscopy with GoLytely/Dulcolax prep  -D/C Bentyl  -Trial Levsin 0.125 mg every 6 as needed for pain  -Follow-up in office after test    I reviewed with patient/family potential risks of endoscopic evaluation including possible infection, bleeding or perforation. Patient/family verbalized understanding of potential risks and agreed to procedure(s). ______________________________________________________________________    SUBJECTIVE: 60-year-old female here for follow-up. She was last seen by myself 9/25/2023 for abdominal bloating, diarrhea and lower abdominal pain. This pain started approximately 8 months ago with a sudden change in bowel pattern. Stools brown and formed daily initially but then became loose. [place] stool scale 7 approximately 3 times a week. Cannot pinpoint triggers. Denies any melena or hematochezia. No new medications travel or herbal use. No skin rash or frequent eye infections.   No family history of any colon cancers, polyps inflammatory bowel disease. She does have an aunt with celiac disease. She cannot correlate loose stools with dairy products. No weight loss. Pain can awaken her at night and reports it is a cramping sensation. She did start Bentyl which helped. Pain resolved at last visit and stools were less frequent. Stool studies and labs were recommended at that visit. Stool studies were negative. Celiac panel was normal.  Fecal calprotectin was normal.  She is here today for follow-up. She was doing well for several months but symptoms returned. She is reporting lower abdominal cramping primarily left lower quadrant greater than right lower quadrant. Cannot pinpoint triggers. She does have loose stools associated with this abdominal pain. Occasionally pain is improved with a BM. BMs are 2-3 times per day and can awaken her at night as well as pain can awaken her at night. She does use Bentyl 10 mg 4 times daily but she gets constipated from it. She is try to reduce the dose but then that does not control her abdominal pain. No weight loss. No melena or hematochezia. He has resolved since last visit. Prior EGD/colonoscopy     EGD 2017 which noted small hiatal hernia per patient. Report not available during visit today. REVIEW OF SYSTEMS IS OTHERWISE NEGATIVE.   10 point review of systems negative other than per HPI      Historical Information   Past Medical History:   Diagnosis Date   • Allergic rhinitis    • Asthma    • Environmental allergies    • Lyme disease    • Reflux esophagitis      Past Surgical History:   Procedure Laterality Date   • ADENOIDECTOMY     • LASIK  01/2021   • NASAL SINUS SURGERY     • TONSILLECTOMY  2001   • UPPER GASTROINTESTINAL ENDOSCOPY       Social History   Social History     Substance and Sexual Activity   Alcohol Use Yes    Comment: socially      Social History     Substance and Sexual Activity   Drug Use Never     Social History     Tobacco Use   Smoking Status Never   Smokeless Tobacco Never     Family History   Problem Relation Age of Onset   • Hyperthyroidism Mother    • Clotting disorder Mother    • Diabetes Father    • No Known Problems Sister    • Hyperthyroidism Maternal Grandmother    • No Known Problems Maternal Grandfather    • No Known Problems Paternal Grandmother    • No Known Problems Paternal Grandfather        Meds/Allergies       Current Outpatient Medications:   •  albuterol (Ventolin HFA) 90 mcg/act inhaler  •  amphetamine-dextroamphetamine (ADDERALL) 15 MG tablet  •  cetirizine (ZyrTEC) 10 mg tablet  •  dicyclomine (BENTYL) 10 mg capsule  •  DULoxetine (CYMBALTA) 30 mg delayed release capsule  •  fluticasone (FLONASE) 50 mcg/act nasal spray  •  hyoscyamine (LEVSIN/SL) 0.125 mg SL tablet  •  levonorgestrel (KYLEENA) 19.5 MG intrauterine device  •  polyethylene glycol (Golytely) 4000 mL solution  •  EPINEPHrine (EPIPEN) 0.3 mg/0.3 mL SOAJ    Allergies   Allergen Reactions   • Clindamycin/Lincomycin Hives     hives and flushed   • Latex            Objective     Blood pressure 116/87, pulse 91, temperature 99 °F (37.2 °C), height 5' (1.524 m), weight 72.8 kg (160 lb 6.4 oz), SpO2 98 %. Body mass index is 31.33 kg/m². PHYSICAL EXAM:      General Appearance:   Alert, cooperative, no distress   HEENT:   Normocephalic, atraumatic, anicteric. Neck:  Supple, symmetrical, trachea midline   Lungs:   Clear to auscultation bilaterally; no rales, rhonchi or wheezing; respirations unlabored    Heart[de-identified]   Regular rate and rhythm; no murmur, rub, or gallop.    Abdomen:   Soft, non-tender, non-distended; normal bowel sounds; no masses, no organomegaly    Genitalia:   Deferred    Rectal:   Deferred    Extremities:  No cyanosis, clubbing or edema    Pulses:  2+ and symmetric    Skin:  No jaundice, rashes, or lesions    Lymph nodes:  No palpable cervical lymphadenopathy        Lab Results:   No visits with results within 1 Day(s) from this visit. Latest known visit with results is:   Appointment on 09/28/2023   Component Date Value   • Calprotectin 09/28/2023 7    • Salmonella sp PCR 09/28/2023 None Detected    • Shigella sp/Enteroinvasi* 09/28/2023 None Detected    • Campylobacter sp (jejuni* 09/28/2023 None Detected    • Shiga toxin 1/Shiga toxi* 09/28/2023 None Detected    • H pylori Ag, Stl 09/28/2023 Negative    • Giardia Ag, Stl 09/28/2023 Negative          Radiology Results:   No results found.

## 2023-11-29 NOTE — PATIENT INSTRUCTIONS
Levsin  Moderate Sedation   AMBULATORY CARE:   What you need to know about moderate sedation:  Moderate sedation, or conscious sedation, is medicine used during procedures to help you feel relaxed and calm. You will be awake and able to follow directions without anxiety or pain. You will remember little to none of the procedure. Moderate sedation can be used for procedures such as a colonoscopy, wound repair, cataract removal, or dental work. The medicine is given as a pill, shot, inhaled solution, or injection through an IV. How to prepare for moderate sedation:  Your healthcare provider will talk to you about how to prepare for moderate sedation. You may be told not to eat or drink anything for 8 hours before moderate sedation. You may be able to drink clear liquids up until 2 hours before moderate sedation. Tell healthcare providers if you have any allergies, heart problems, or breathing problems. Arrange for someone to drive you home and stay with you for 24 hours. You may feel sleepy and need help doing things at home. Another person may need to call 911 if you cannot be woken. What will happen during moderate sedation:  Your healthcare provider will give you enough medicine to keep you relaxed and calm. Your healthcare provider will monitor your blood pressure, heart rate, and breathing. You will be on a heart monitor and a pulse oximeter. A heart monitor is a safety device that stays on continuously to record your heart's electrical activity. A pulse oximeter is a device that measures the amount of oxygen in your blood. You may get oxygen through a mask placed over your nose and mouth or through small tubes placed in your nostrils. What will happen after moderate sedation:  Healthcare providers will monitor you until you are awake. You may need extra oxygen if your blood oxygen level is lower than it should be. Ask your healthcare provider before you take off the mask or oxygen tubing.  You may be able to go home when you are alert and can stand up. This may take 1 to 2 hours after you have received moderate sedation. You may feel tired, weak, or unsteady on your feet after you get sedation. You may also have trouble concentrating or short-term memory loss. These symptoms should go away in 24 hours or less. Risks of moderate sedation:   You may get a headache or nausea from the medicine. You may have problems with your short-term memory. Your skin may itch or your eyes may water. You may not get enough sedation, or it may wear off quickly. You may feel restless during the procedure or as you wake up. Too much medicine can cause deep sedation. Your healthcare provider may have trouble waking you, and you may need medicine to help you wake up. Your breathing may not be regular, or it may stop. You may need a ventilator to help you breathe. Your risk for problems with sedation is higher if you have heart or lung disease, a head injury, or drink alcohol. Call 911 or have someone else call for any of the following: You have sudden trouble breathing. You cannot be woken. Seek care immediately if:   You have a severe headache or dizziness. Your heart is beating faster than usual.    Contact your healthcare provider if:   You have a fever. You have nausea or are vomiting for more than 8 hours after the procedure. Your skin is itchy, swollen, or you have a rash. You have questions or concerns about your condition or care. Self-care:   Have someone stay with you for 24 hours. This person can drive you to errands and help you do things around the house. This person can also watch for problems. Rest and do quiet activities for 24 hours. Do not exercise, ride a bike, or play sports. Stand up slowly to prevent dizziness and falls. Take short walks around the house with another person. Slowly return to your usual activities the next day.     Do not drive or use dangerous machines or tools for 24 hours. You may injure yourself or others. Examples include a lawnmower, saw, or drill. Do not return to work for 24 hours if you use dangerous machines or tools for work. Do not make important decisions for 24 hours. For example, do not sign important papers or invest money. Drink liquids as directed. Liquids help flush the sedation medicine out of your body. Ask how much liquid to drink each day and which liquids are best for you. Eat small, frequent meals to prevent nausea and vomiting. Start with clear liquids such as juice or broth. If you do not vomit after clear liquids, you can eat your usual foods. Do not drink alcohol or take medicines that make you drowsy. This includes medicines that help you sleep and anxiety medicines. Ask your healthcare provider if it is safe for you to take pain medicine. Follow up with your healthcare provider as directed:  Write down your questions so you remember to ask them during your visits. © Copyright Kenneth Camacho 2023 Information is for End User's use only and may not be sold, redistributed or otherwise used for commercial purposes. The above information is an  only. It is not intended as medical advice for individual conditions or treatments. Talk to your doctor, nurse or pharmacist before following any medical regimen to see if it is safe and effective for you. Colonoscopy   WHAT YOU NEED TO KNOW:   What do I need to know about a colonoscopy? A colonoscopy is a procedure to examine the inside of your colon (intestine) with a scope. A scope is a flexible tube with a small light and camera on the end. Polyps or tissue growths may be removed during your colonoscopy. What do I need to do the week before my colonoscopy? Give your healthcare provider a list of all the medicines, supplements, and herbs you take. You will need to stop taking medicines that contain aspirin or iron for 7 days before your colonoscopy.  If you take a blood thinner, such as warfarin, ask when you should stop taking it. Make plans for someone to drive you home after your procedure. How do I prepare for my colonoscopy? Your healthcare provider will have you prepare your bowels before your procedure. It is important for your bowels to be empty before your procedure to allow him or her to see your colon clearly. You will need to do the following:  Have only clear liquids for the entire day before your colonoscopy. Clear liquids include plain gelatin, unsweetened fruit juices, clear soup, and broth. Do not drink any liquid that is blue, red, or purple. Follow your bowel prep as directed. There are many different preparations that can be given before a colonoscopy. With any bowel prep, stay close to the bathroom. This prep will cause your bowels to move often. Use an enema if directed. Your healthcare provider may tell you to use an enema to help clean out your bowels. Do not eat or drink anything after midnight. This will help prevent problems that can happen if you vomit while under anesthesia. What will happen during my colonoscopy? You will be given medicine to help you relax. You will lie on your left side and raise one or both knees toward your chest. Your healthcare provider will examine your anus and use a gloved finger to check your rectum. You may need another enema if your bowel is not empty. The scope will be lubricated and gently placed into your anus. It will then be passed through your rectum and into your colon. Water or air will be put into your colon to help clean or expand it. This is done so your healthcare provider can see your colon clearly. Tissue samples may be taken from the walls of your bowel and sent to a lab for tests. If you have a polyp, your healthcare provider will pass a wire loop through the scope and use it to hold the polyp. The polyp is then removed from the wall of your colon. You should not feel this.  The polyps are sent to a lab for tests. Pictures of your colon may be taken during the procedure. What will happen after my colonoscopy? You may feel bloated or have some gas and abdominal discomfort. You may need to lie on your left side with a heating pad on your abdomen. Eat small meals until your bloating has improved. What are the risks of a colonoscopy? You may have pain or bleeding. You may also have a slow heartbeat, decreased blood pressure, or increased sweating. Your colon may tear due to the increased pressure from the scope and other instruments. This may cause bowel contents to leak out of your colon and into your abdomen. If this happens, you will need to stay in the hospital and have surgery on your colon. CARE AGREEMENT:   You have the right to help plan your care. Learn about your health condition and how it may be treated. Discuss treatment options with your healthcare providers to decide what care you want to receive. You always have the right to refuse treatment. The above information is an  only. It is not intended as medical advice for individual conditions or treatments. Talk to your doctor, nurse or pharmacist before following any medical regimen to see if it is safe and effective for you. © Copyright [facility] 2023 Information is for End User's use only and may not be sold, redistributed or otherwise used for commercial purposes.

## 2023-11-30 ENCOUNTER — TELEPHONE (OUTPATIENT)
Age: 23
End: 2023-11-30

## 2023-11-30 NOTE — TELEPHONE ENCOUNTER
Called [facility]  Hyoscyamine is not covered by plan at all. No PA can be done. Plan exclusion.   Routing to GI Office

## 2023-12-01 NOTE — TELEPHONE ENCOUNTER
Called pharmacy to request for Pt's out-of-pocket cost and it will be $14.38. Pharmacy would like to know if they should process script, please advise, thank you!

## 2023-12-01 NOTE — TELEPHONE ENCOUNTER
Called and informed Pt of medication cost and Pt agreed for the medication to be filled. Called and informed Pt's pharmacy on file, [facility], and informed that Pt would like medication to be filled and ready for pick-up. Pharmacy rep stated understanding and will have medication ready by 3 PM today, thank you!

## 2023-12-01 NOTE — TELEPHONE ENCOUNTER
Can you contact patient and let her know what the cost would be and then if she is okay with that they can fill it. Thanks.

## 2023-12-28 ENCOUNTER — ANESTHESIA (OUTPATIENT)
Dept: ANESTHESIOLOGY | Facility: HOSPITAL | Age: 23
End: 2023-12-28

## 2023-12-28 ENCOUNTER — ANESTHESIA EVENT (OUTPATIENT)
Dept: ANESTHESIOLOGY | Facility: HOSPITAL | Age: 23
End: 2023-12-28

## 2024-01-03 RX ORDER — ONDANSETRON 2 MG/ML
4 INJECTION INTRAMUSCULAR; INTRAVENOUS ONCE AS NEEDED
Status: CANCELLED | OUTPATIENT
Start: 2024-01-03

## 2024-01-03 RX ORDER — SODIUM CHLORIDE 9 MG/ML
125 INJECTION, SOLUTION INTRAVENOUS CONTINUOUS
Status: CANCELLED | OUTPATIENT
Start: 2024-01-03

## 2024-01-04 ENCOUNTER — ANESTHESIA (OUTPATIENT)
Dept: GASTROENTEROLOGY | Facility: MEDICAL CENTER | Age: 24
End: 2024-01-04

## 2024-01-04 ENCOUNTER — ANESTHESIA EVENT (OUTPATIENT)
Dept: GASTROENTEROLOGY | Facility: MEDICAL CENTER | Age: 24
End: 2024-01-04

## 2024-01-04 ENCOUNTER — HOSPITAL ENCOUNTER (OUTPATIENT)
Dept: GASTROENTEROLOGY | Facility: MEDICAL CENTER | Age: 24
Setting detail: OUTPATIENT SURGERY
End: 2024-01-04
Payer: COMMERCIAL

## 2024-01-04 VITALS
DIASTOLIC BLOOD PRESSURE: 99 MMHG | RESPIRATION RATE: 18 BRPM | HEART RATE: 90 BPM | HEIGHT: 60 IN | TEMPERATURE: 98.5 F | SYSTOLIC BLOOD PRESSURE: 130 MMHG | OXYGEN SATURATION: 100 % | BODY MASS INDEX: 31.41 KG/M2 | WEIGHT: 160 LBS

## 2024-01-04 DIAGNOSIS — R10.30 LOWER ABDOMINAL PAIN: ICD-10-CM

## 2024-01-04 PROCEDURE — 81025 URINE PREGNANCY TEST: CPT | Performed by: ANESTHESIOLOGY

## 2024-01-04 PROCEDURE — 45380 COLONOSCOPY AND BIOPSY: CPT | Performed by: INTERNAL MEDICINE

## 2024-01-04 PROCEDURE — 88305 TISSUE EXAM BY PATHOLOGIST: CPT | Performed by: SPECIALIST

## 2024-01-04 RX ORDER — SODIUM CHLORIDE 9 MG/ML
125 INJECTION, SOLUTION INTRAVENOUS CONTINUOUS
Status: SHIPPED | OUTPATIENT
Start: 2024-01-04

## 2024-01-04 RX ORDER — ONDANSETRON 2 MG/ML
4 INJECTION INTRAMUSCULAR; INTRAVENOUS ONCE AS NEEDED
Status: SHIPPED | OUTPATIENT
Start: 2024-01-04

## 2024-01-04 RX ORDER — PROPOFOL 10 MG/ML
INJECTION, EMULSION INTRAVENOUS AS NEEDED
Status: DISCONTINUED | OUTPATIENT
Start: 2024-01-04 | End: 2024-01-04

## 2024-01-04 RX ORDER — LIDOCAINE HYDROCHLORIDE 20 MG/ML
INJECTION, SOLUTION EPIDURAL; INFILTRATION; INTRACAUDAL; PERINEURAL AS NEEDED
Status: DISCONTINUED | OUTPATIENT
Start: 2024-01-04 | End: 2024-01-04

## 2024-01-04 RX ADMIN — PROPOFOL 100 MG: 10 INJECTION, EMULSION INTRAVENOUS at 09:39

## 2024-01-04 RX ADMIN — PROPOFOL 100 MG: 10 INJECTION, EMULSION INTRAVENOUS at 09:37

## 2024-01-04 RX ADMIN — SODIUM CHLORIDE 125 ML/HR: 0.9 INJECTION, SOLUTION INTRAVENOUS at 09:30

## 2024-01-04 RX ADMIN — LIDOCAINE HYDROCHLORIDE 50 MG: 20 INJECTION, SOLUTION EPIDURAL; INFILTRATION; INTRACAUDAL at 09:34

## 2024-01-04 RX ADMIN — PROPOFOL 50 MG: 10 INJECTION, EMULSION INTRAVENOUS at 09:41

## 2024-01-04 RX ADMIN — PROPOFOL 100 MG: 10 INJECTION, EMULSION INTRAVENOUS at 09:35

## 2024-01-04 NOTE — H&P
History and Physical -  [facility]  Nikia Del Valle 23 y.o. female MRN: [id number]                  HPI: Nikia Del Valle is a 23 y.o. year old female who presents for colonoscopy for the assessment of abdominal pain      REVIEW OF SYSTEMS: Per the HPI, and otherwise unremarkable.    Historical Information   Past Medical History:   Diagnosis Date    Allergic rhinitis     Asthma     Environmental allergies     Lyme disease     Reflux esophagitis      Past Surgical History:   Procedure Laterality Date    ADENOIDECTOMY      LASIK  01/2021    NASAL SINUS SURGERY      TONSILLECTOMY  2001    UPPER GASTROINTESTINAL ENDOSCOPY       Social History   Social History     Substance and Sexual Activity   Alcohol Use Yes    Comment: socially      Social History     Substance and Sexual Activity   Drug Use Never     Social History     Tobacco Use   Smoking Status Never   Smokeless Tobacco Never     Family History   Problem Relation Age of Onset    Hyperthyroidism Mother     Clotting disorder Mother     Diabetes Father     No Known Problems Sister     Hyperthyroidism Maternal Grandmother     No Known Problems Maternal Grandfather     No Known Problems Paternal Grandmother     No Known Problems Paternal Grandfather        Meds/Allergies     (Not in a hospital admission)      Allergies   Allergen Reactions    Clindamycin/Lincomycin Hives     hives and flushed    Latex        Objective     There were no vitals taken for this visit.      PHYSICAL EXAM    Gen: NAD  CV: RRR  CHEST: Clear  ABD: soft, NT/ND  EXT: no edema      ASSESSMENT/PLAN:  This is a 23 y.o. year old female here for colonoscopy

## 2024-01-04 NOTE — ANESTHESIA PREPROCEDURE EVALUATION
Procedure:  COLONOSCOPY    Relevant Problems   ANESTHESIA (within normal limits)      CARDIO (within normal limits)      NEURO/PSYCH   (+) Anxiety      PULMONARY   (+) Mild intermittent asthma without complication        Physical Exam    Airway    Mallampati score: II  TM Distance: >3 FB  Neck ROM: full     Dental       Cardiovascular  Cardiovascular exam normal    Pulmonary  Pulmonary exam normal     Other Findings  post-pubertal.      Anesthesia Plan  ASA Score- 2     Anesthesia Type- IV sedation with anesthesia with ASA Monitors.         Additional Monitors:     Airway Plan:            Plan Factors-    Chart reviewed.    Patient summary reviewed.                  Induction- intravenous.    Postoperative Plan-     Informed Consent- Anesthetic plan and risks discussed with patient.

## 2024-01-08 PROCEDURE — 88305 TISSUE EXAM BY PATHOLOGIST: CPT | Performed by: SPECIALIST

## 2024-01-13 DIAGNOSIS — R14.0 ABDOMINAL BLOATING: ICD-10-CM

## 2024-01-15 RX ORDER — DICYCLOMINE HYDROCHLORIDE 10 MG/1
CAPSULE ORAL
Qty: 120 CAPSULE | Refills: 1 | Status: SHIPPED | OUTPATIENT
Start: 2024-01-15

## 2024-02-29 ENCOUNTER — OFFICE VISIT (OUTPATIENT)
Dept: GASTROENTEROLOGY | Facility: MEDICAL CENTER | Age: 24
End: 2024-02-29
Payer: COMMERCIAL

## 2024-02-29 VITALS
TEMPERATURE: 98.2 F | BODY MASS INDEX: 31.84 KG/M2 | DIASTOLIC BLOOD PRESSURE: 82 MMHG | SYSTOLIC BLOOD PRESSURE: 116 MMHG | HEART RATE: 93 BPM | HEIGHT: 60 IN | WEIGHT: 162.2 LBS

## 2024-02-29 DIAGNOSIS — K58.9 IRRITABLE BOWEL SYNDROME, UNSPECIFIED TYPE: Primary | ICD-10-CM

## 2024-02-29 PROCEDURE — 99213 OFFICE O/P EST LOW 20 MIN: CPT | Performed by: NURSE PRACTITIONER

## 2024-02-29 NOTE — PROGRESS NOTES
"[facility] - Outpatient Follow-up Note  Nikia Del Valle 24 y.o. female MRN: [id number]  Encounter: [id number]          ASSESSMENT AND PLAN:      1.  Diarrhea  2.  Lower abdominal pain    Reported several months of lower abdominal cramping associated with loose stools.  Recent colonoscopy was normal.  Biopsies negative for microscopic colitis.  Labs including celiac, fecal calprotectin and TSH were normal.  Loose stools resolved after her colonoscopy.  Reports she is back to a \"normal\" pattern for her which is formed BMs 3-4 times per week.  Rare lower abdominal cramping that is better with a BM if she has not had a BM for 3 to 4 days.  Denies any melena or hematochezia.  Suspect this is an IBS etiology.  We discussed fiber supplement and high-fiber diet.    -Start fiber supplement daily  -High-fiber diet  -Follow-up in a year or sooner if needed  ______________________________________________________________________    SUBJECTIVE: 24-year-old female here for follow-up.  She was last seen by myself 11/29/2023 for diarrhea and lower abdominal pain.        History of lower abdominal cramping primarily primarily in left lower quadrant for several months.  It was associated with loose stools.  Pain did improved with Bentyl 10 mg 4 times daily but it caused her some constipation.    She was switched to hyoscyamine but developed dizziness after taking it once and it was stopped.  Could not pinpoint triggers.  Occasionally pain was better with a BM.  Stools were loose 2-3 times per day.No weight loss.  Recent colonoscopy was normal.  Biopsies were negative for microscopic colitis.  Stool studies and celiac panel were negative.  Equal calprotectin was normal.  CBC, CMP and TSH were normal.  Stool for H. pylori was also negative.  Reports that her loose stools and abdominal pain have resolved since she had a colonoscopy.  She is back to her \"normal\" pattern of BMs 3-4 times per week.  Stools are " solid.  Denies any melena or hematochezia.  Rare lower abdominal pain if she has not had a BM in 2 to 3 days.    Prior EGD/colonoscopy     Colonoscopy 1/24-normal.  Biopsies were negative for microscopic colitis.  No active or chronic colitis noted    EGD 2017 which noted small hiatal hernia per patient. Report not available during visit today.     REVIEW OF SYSTEMS IS OTHERWISE NEGATIVE.  10 point review of systems negative other than per HPI    Historical Information   Past Medical History:   Diagnosis Date   • Allergic rhinitis    • Asthma    • Environmental allergies    • GERD (gastroesophageal reflux disease) 2001   • Lyme disease    • Mono exposure    • Reflux esophagitis      Past Surgical History:   Procedure Laterality Date   • ADENOIDECTOMY     • LASIK  01/2021   • NASAL SINUS SURGERY     • TONSILLECTOMY  2001   • UPPER GASTROINTESTINAL ENDOSCOPY       Social History   Social History     Substance and Sexual Activity   Alcohol Use Yes    Comment: socially      Social History     Substance and Sexual Activity   Drug Use Never     Social History     Tobacco Use   Smoking Status Never   Smokeless Tobacco Never     Family History   Problem Relation Age of Onset   • Hyperthyroidism Mother    • Clotting disorder Mother    • Diabetes Father    • No Known Problems Sister    • Hyperthyroidism Maternal Grandmother    • No Known Problems Maternal Grandfather    • No Known Problems Paternal Grandmother    • No Known Problems Paternal Grandfather        Meds/Allergies       Current Outpatient Medications:   •  albuterol (Ventolin HFA) 90 mcg/act inhaler  •  amphetamine-dextroamphetamine (ADDERALL) 15 MG tablet  •  cetirizine (ZyrTEC) 10 mg tablet  •  DULoxetine (CYMBALTA) 30 mg delayed release capsule  •  fluticasone (FLONASE) 50 mcg/act nasal spray  •  levonorgestrel (KYLEENA) 19.5 MG intrauterine device  •  dicyclomine (BENTYL) 10 mg capsule  •  EPINEPHrine (EPIPEN) 0.3 mg/0.3 mL SOAJ  •  hyoscyamine (LEVSIN/SL)  0.125 mg SL tablet  •  polyethylene glycol (Golytely) 4000 mL solution    Allergies   Allergen Reactions   • Clindamycin/Lincomycin Hives     hives and flushed   • Latex            Objective     Blood pressure 116/82, pulse 93, temperature 98.2 °F (36.8 °C), temperature source Tympanic, height 5' (1.524 m), weight 73.6 kg (162 lb 3.2 oz). Body mass index is 31.68 kg/m².      PHYSICAL EXAM:      General Appearance:   Alert, cooperative, no distress   HEENT:   Normocephalic, atraumatic, anicteric.     Neck:  Supple, symmetrical, trachea midline   Lungs:   Clear to auscultation bilaterally; no rales, rhonchi or wheezing; respirations unlabored    Heart::   Regular rate and rhythm; no murmur, rub, or gallop.   Abdomen:   Soft, non-tender, non-distended; normal bowel sounds; no masses, no organomegaly    Genitalia:   Deferred    Rectal:   Deferred    Extremities:  No cyanosis, clubbing or edema    Pulses:  2+ and symmetric    Skin:  No jaundice, rashes, or lesions    Lymph nodes:  No palpable cervical lymphadenopathy        Lab Results:   No visits with results within 1 Day(s) from this visit.   Latest known visit with results is:   Hospital Outpatient Visit on 01/04/2024   Component Date Value   • EXT Preg Test, Ur 01/04/2024  (NEGATIVE)    • Control 01/04/2024  (NEGATIVE)    • Case Report 01/04/2024                      Value:Surgical Pathology Report                         Case: [id number]                                  Authorizing Provider:  Garland Chung MD  Collected:           01/04/2024 0942              Ordering Location:     [facility]        Received:            01/04/2024 [address] [place] [address] Endoscopy                                                     Pathologist:           Najma Valderrama MD                                                     Specimen:    Colon, random colon bx's r/o microscopic colitis                                          •  Final Diagnosis 01/04/2024                      Value:This result contains rich text formatting which cannot be displayed here.   • Note 01/04/2024                      Value:This result contains rich text formatting which cannot be displayed here.   • Additional Information 01/04/2024                      Value:This result contains rich text formatting which cannot be displayed here.   • Gross Description 01/04/2024                      Value:This result contains rich text formatting which cannot be displayed here.         Radiology Results:   No results found.

## 2024-03-05 ENCOUNTER — APPOINTMENT (OUTPATIENT)
Dept: RADIOLOGY | Age: 24
End: 2024-03-05
Payer: COMMERCIAL

## 2024-03-05 ENCOUNTER — OFFICE VISIT (OUTPATIENT)
Dept: URGENT CARE | Age: 24
End: 2024-03-05
Payer: COMMERCIAL

## 2024-03-05 VITALS
SYSTOLIC BLOOD PRESSURE: 140 MMHG | HEART RATE: 70 BPM | RESPIRATION RATE: 18 BRPM | DIASTOLIC BLOOD PRESSURE: 86 MMHG | TEMPERATURE: 98.6 F | OXYGEN SATURATION: 99 %

## 2024-03-05 DIAGNOSIS — S99.911A INJURY OF RIGHT ANKLE, INITIAL ENCOUNTER: ICD-10-CM

## 2024-03-05 DIAGNOSIS — S93.401A SPRAIN OF RIGHT ANKLE, UNSPECIFIED LIGAMENT, INITIAL ENCOUNTER: Primary | ICD-10-CM

## 2024-03-05 PROCEDURE — 73610 X-RAY EXAM OF ANKLE: CPT

## 2024-03-05 PROCEDURE — 99213 OFFICE O/P EST LOW 20 MIN: CPT | Performed by: ORTHOPAEDIC SURGERY

## 2024-03-05 PROCEDURE — 73630 X-RAY EXAM OF FOOT: CPT

## 2024-03-05 NOTE — PROGRESS NOTES
[facility]        NAME: Nikia Del Valle is a 24 y.o. female  : 2000    MRN: [id number]  DATE: 2024  TIME: 6:40 PM    Assessment and Plan   Sprain of right ankle, unspecified ligament, initial encounter [S93.401A]  1. Sprain of right ankle, unspecified ligament, initial encounter  Cam Boot    Ambulatory Referral to Orthopedic Surgery    Orthopedic injury treatment      2. Injury of right ankle, initial encounter  XR foot 3+ vw right    XR ankle 3+ vw right    Ambulatory Referral to Orthopedic Surgery        X-ray of the right ankle reviewed discussed with patient which shows no signs of fractures or dislocations.  No bony lesions or degenerative changes    Patient Instructions     Pain/Swelling Control:   - You may take over-the-counter Tylenol/NSAIDs as needed.    - ACE wrap as needed around the foot and ankle for swelling.    - Elevation of the affected lower extremity above the level of your heart   - You may ice the area for up to 20 minutes at a time. Do not place ice directly onto skin.     Weight bearing:   - You may weight bear as tolerated on the affect lower extremity  - Please wear your cam boot or ankle brace at all times with activity. You may remove your cam boot or brace for rest, hygiene, and range of motion exercises.    * Note that if in a cam boot on the right lower extremity you are not permitted to drive any vehicle with the cam boot in place.     Exercises:   - Perform exercises for the foot and ankle multiple times per day, as instructed on the exercise sheet provided.     Follow-up:   - A consult has been placed in your chart for follow-up with orthopedics in 1-2 weeks. Please call the [facility] Orthopedic scheduling office at [phone] to schedule your appointment.     - Proceed to the ED if symptoms worsen    If tests are performed, our office will contact you with results only if changes need to made to the care plan discussed with you at the visit. You can  "review your full results on [facility].    Chief Complaint     Chief Complaint   Patient presents with    Foot Pain     Pt c/o right foot foot and ankle pain. Pt went from sitting to standing and rolled right ankle and felt a \"snap\". Slightly swollen and bruised.          History of Present Illness       24-year-old female presents the urgent care for evaluation of acute right ankle injury.  The patient states yesterday afternoon she rolled her ankle while standing up from a seated position, describing a forced inversion type injury.  She notes she was able to bear weight and ambulate, but was very painful.  When asked where the pain is located she points along the lateral aspect of the ankle and lateral edge of the foot along the fifth metatarsal.  The patient notes that she has to ambulate by placing all the weight onto the inside aspect of the foot to avoid pain.  She denies any prior history with regards to her right ankle or foot.  She does admit to numbness and tingling along the same distribution as the pain.  For symptom relief she has been using ice, Advil, and elevation        Review of Systems   Review of Systems   Constitutional:  Negative for chills and fever.   HENT:  Negative for ear pain and sore throat.    Eyes:  Negative for pain and visual disturbance.   Respiratory:  Negative for cough and shortness of breath.    Cardiovascular:  Negative for chest pain and palpitations.   Gastrointestinal:  Negative for abdominal pain and vomiting.   Genitourinary:  Negative for dysuria and hematuria.   Musculoskeletal:  Positive for arthralgias and gait problem. Negative for back pain.   Skin:  Negative for color change and rash.   Neurological:  Negative for seizures and syncope.   All other systems reviewed and are negative.        Current Medications       Current Outpatient Medications:     DULoxetine (CYMBALTA) 30 mg delayed release capsule, Take 1 capsule (30 mg total) by mouth daily, Disp: 90 " capsule, Rfl: 1    levonorgestrel (KYLEENA) 19.5 MG intrauterine device, 1 Intra Uterine Device by Intrauterine route once, Disp: , Rfl:     albuterol (Ventolin HFA) 90 mcg/act inhaler, Inhale 2 puffs every 4 (four) hours as needed for wheezing, Disp: 18 g, Rfl: 3    amphetamine-dextroamphetamine (ADDERALL) 15 MG tablet, Take 15 mg by mouth daily  (Patient not taking: Reported on 3/5/2024), Disp: , Rfl:     cetirizine (ZyrTEC) 10 mg tablet, Take 10 mg by mouth as needed for allergies (Patient not taking: Reported on 3/5/2024), Disp: , Rfl:     dicyclomine (BENTYL) 10 mg capsule, take 1 capsule by mouth four times a day before MEALS AND AT BEDTIME, Disp: 120 capsule, Rfl: 1    EPINEPHrine (EPIPEN) 0.3 mg/0.3 mL SOAJ, Inject 0.3 mL (0.3 mg total) into a muscle once for 1 dose (Patient taking differently: Inject 0.3 mg into a muscle once As needed), Disp: 2 each, Rfl: 3    fluticasone (FLONASE) 50 mcg/act nasal spray, 1 spray into each nostril daily as needed for rhinitis, Disp: , Rfl:     hyoscyamine (LEVSIN/SL) 0.125 mg SL tablet, Take 1 tablet (0.125 mg total) by mouth every 6 (six) hours as needed for cramping, Disp: 90 tablet, Rfl: 3    polyethylene glycol (Golytely) 4000 mL solution, Take 4,000 mL by mouth once for 1 dose Take 4000 mL by mouth once for 1 dose. Use as directed, Disp: 4000 mL, Rfl: 0    Current Allergies     Allergies as of 03/05/2024 - Reviewed 02/29/2024   Allergen Reaction Noted    Clindamycin/lincomycin Hives 04/16/2002    Latex  11/04/2019            The following portions of the patient's history were reviewed and updated as appropriate: allergies, current medications, past family history, past medical history, past social history, past surgical history and problem list.     Past Medical History:   Diagnosis Date    Allergic rhinitis     Asthma     Environmental allergies     GERD (gastroesophageal reflux disease) 2001    Lyme disease     Mono exposure     Reflux esophagitis        Past  Surgical History:   Procedure Laterality Date    ADENOIDECTOMY      LASIK  01/2021    NASAL SINUS SURGERY      TONSILLECTOMY  2001    UPPER GASTROINTESTINAL ENDOSCOPY         Family History   Problem Relation Age of Onset    Hyperthyroidism Mother     Clotting disorder Mother     Diabetes Father     No Known Problems Sister     Hyperthyroidism Maternal Grandmother     No Known Problems Maternal Grandfather     No Known Problems Paternal Grandmother     No Known Problems Paternal Grandfather          Medications have been verified.        Objective   /86   Pulse 70   Temp 98.6 °F (37 °C) (Tympanic)   Resp 18   LMP 02/21/2024 (Approximate)   SpO2 99%        Physical Exam     Physical Exam  Vitals and nursing note reviewed.   Constitutional:       Appearance: Normal appearance.   HENT:      Head: Normocephalic and atraumatic.      Nose: Nose normal.      Mouth/Throat:      Pharynx: Oropharynx is clear.   Eyes:      Extraocular Movements: Extraocular movements intact.      Pupils: Pupils are equal, round, and reactive to light.   Cardiovascular:      Rate and Rhythm: Normal rate and regular rhythm.      Pulses: Normal pulses.   Pulmonary:      Effort: Pulmonary effort is normal. No respiratory distress.      Breath sounds: Normal breath sounds.   Abdominal:      Palpations: Abdomen is soft.   Musculoskeletal:      Cervical back: Normal range of motion.      Comments: Right ankle/foot:  Patient is able to stand and ambulate on her own, mild antalgic gait.  Skin without lesions, ecchymosis, erythema, warmth, swelling.  Patient is to palpable tenderness along the ATFL, CFL, fourth and fifth metatarsal shafts up to the cuneiforms.  She has full active ankle range of motion with 5/5 strength.  Negative anterior drawer.  Negative syndesmotic compression test.  Sensation tact light touch along the DP/SP/SA/MCKINLEY/T nerve distributions.  Strong pedal pulse.  Brisk cap refill in all toes.   Skin:     General: Skin is warm  "and dry.      Capillary Refill: Capillary refill takes less than 2 seconds.   Neurological:      General: No focal deficit present.      Mental Status: She is alert and oriented to person, place, and time.   Psychiatric:         Mood and Affect: Mood normal.         Behavior: Behavior normal.               Orthopedic injury treatment    Date/Time: 3/5/2024 5:00 PM    Performed by: Autumn Mustafa PA-C  Authorized by: Autumn Mustafa PA-C    Patient Location:  [facility] Protocol:  Consent: Verbal consent obtained.  Risks and benefits: risks, benefits and alternatives were discussed  Consent given by: patient  Time out: Immediately prior to procedure a \"time out\" was called to verify the correct patient, procedure, equipment, support staff and site/side marked as required.  Timeout called at: 3/5/2024 6:39 PM.  Patient understanding: patient states understanding of the procedure being performed  Radiology Images displayed and confirmed. If images not available, report reviewed: imaging studies available  Patient identity confirmed: verbally with patient    Injury location:  Ankle  Location details:  Right ankle  Injury type:  Soft tissue  Neurovascular status: Neurovascularly intact    Distal perfusion: normal    Neurological function: normal    Range of motion: normal    Immobilization:  Other (comment) (cam boot)  Neurovascular status: Neurovascularly intact    Distal perfusion: normal    Neurological function: normal    Patient tolerance:  Patient tolerated the procedure well with no immediate complications        "

## 2024-03-05 NOTE — PATIENT INSTRUCTIONS
Pain/Swelling Control:   - You may take over-the-counter Tylenol/NSAIDs as needed.    - ACE wrap as needed around the foot and ankle for swelling.    - Elevation of the affected lower extremity above the level of your heart   - You may ice the area for up to 20 minutes at a time. Do not place ice directly onto skin.     Weight bearing:   - You may weight bear as tolerated on the affect lower extremity  - Please wear your cam boot or ankle brace at all times with activity. You may remove your cam boot or brace for rest, hygiene, and range of motion exercises.    * Note that if in a cam boot on the right lower extremity you are not permitted to drive any vehicle with the cam boot in place.     Exercises:   - Perform exercises for the foot and ankle multiple times per day, as instructed on the exercise sheet provided.     Follow-up:   - A consult has been placed in your chart for follow-up with orthopedics in 1-2 weeks. Please call the [facility] Orthopedic scheduling office at [phone] to schedule your appointment.     - Proceed to the ED if symptoms worsen

## 2024-03-06 ENCOUNTER — OFFICE VISIT (OUTPATIENT)
Dept: OBGYN CLINIC | Facility: CLINIC | Age: 24
End: 2024-03-06
Payer: COMMERCIAL

## 2024-03-06 VITALS
DIASTOLIC BLOOD PRESSURE: 86 MMHG | HEIGHT: 60 IN | WEIGHT: 162 LBS | SYSTOLIC BLOOD PRESSURE: 140 MMHG | BODY MASS INDEX: 31.8 KG/M2

## 2024-03-06 DIAGNOSIS — S99.911A INJURY OF RIGHT ANKLE, INITIAL ENCOUNTER: ICD-10-CM

## 2024-03-06 DIAGNOSIS — S93.401A SPRAIN OF RIGHT ANKLE, UNSPECIFIED LIGAMENT, INITIAL ENCOUNTER: ICD-10-CM

## 2024-03-06 PROCEDURE — 99203 OFFICE O/P NEW LOW 30 MIN: CPT | Performed by: FAMILY MEDICINE

## 2024-03-06 NOTE — PROGRESS NOTES
Assessment:     1. Injury of right ankle, initial encounter  Ambulatory Referral to Orthopedic Surgery      2. Sprain of right ankle, unspecified ligament, initial encounter  Ambulatory Referral to Orthopedic Surgery        No orders of the defined types were placed in this encounter.       Impression:   Right ankle pain likely secondary to ankle sprain.    Date of injury: 03/04/2024  Mechanism of injury: Stood up/inversion  Follow-up from injury: 2 days      Conservative Management   We discussed different treatment options:  Reviewed care now documentation completed on 03/05/2024.  Treatment plan consisted of x-rays.  In cam boot.  And referral to Ortho/sports medicine.  Ice or Heat Therapy as needed 1-2 times daily for 10-20 minutes. As tolerated.   Over the counter Tylenol and/or NSAIDs  as needed based off your Past Medical Hx. Please follow product label for dosing and maximum limits.    Formal Handout provided on General Information of PRICE  Continue with cam boot.  Offered crutches.  Patient declined.  Cam boot may be removed for sleep and hygiene        Imaging   All imaging from today was reviewed by myself and explained to the patient.   03/05/2024 right ankle x-ray: No acute osseous abnormality  03/05/2024 right foot x-ray: No acute osseous abnormality    Procedure  Not appropriate at this time.     Shared decision making, patient agreeable to plan.      Return for Follow up after 2 wks.    HPI:   Nikia Del Valle is a 24 y.o. female  who presents for evaluation of   Chief Complaint   Patient presents with    Right Ankle - Pain    Right Foot - Pain       Occupation: Seated position  Injury Related: Yes, Date of Injury: 03/04/2024    Onset/Mechanism: Patient stood up on Monday from her chair and rolled her right ankle.  Immediate pain.  Able to ambulate with a limp.  Location: Fifth metatarsal, lateral malleolus.  Severity: Current severity: 4/10. Max severity: 7/10.  Pain described as: Throbbing  Radiation:  Will radiate up distal lower extremity.  Pain will radiate up into the knee  Provocative: Walking/weightbearing.  Associated symptoms: Minimal swelling.  Numbness and tingling in the foot    Denies any hx of fracture of affected limb.   Denies any surgical history of affected limb.      Summary of treatment to-date:   Cam boot  Rest  Ice therapy  Ibuprofen/Tylenol      Following History Reviewed and Updated     Past Medical History:   Diagnosis Date    Allergic rhinitis     Asthma     Environmental allergies     GERD (gastroesophageal reflux disease) 2001    Lyme disease     Mono exposure     Reflux esophagitis      Past Surgical History:   Procedure Laterality Date    ADENOIDECTOMY      LASIK  01/2021    NASAL SINUS SURGERY      TONSILLECTOMY  2001    UPPER GASTROINTESTINAL ENDOSCOPY       Family History   Problem Relation Age of Onset    Hyperthyroidism Mother     Clotting disorder Mother     Diabetes Father     No Known Problems Sister     Hyperthyroidism Maternal Grandmother     No Known Problems Maternal Grandfather     No Known Problems Paternal Grandmother     No Known Problems Paternal Grandfather        Social History     Substance and Sexual Activity   Alcohol Use Yes    Comment: socially      Social History     Substance and Sexual Activity   Drug Use Never     Social History     Tobacco Use   Smoking Status Never   Smokeless Tobacco Never       Social Determinants of Health     Tobacco Use: Low Risk  (3/6/2024)    Patient History     Smoking Tobacco Use: Never     Smokeless Tobacco Use: Never     Passive Exposure: Not on file   Alcohol Use: Not on file   Financial Resource Strain: Not on file   Food Insecurity: Not on file   Transportation Needs: Not on file   Physical Activity: Not on file   Stress: Not on file   Social Connections: Not on file   Intimate Partner Violence: Not on file   Depression: Not at risk (9/18/2023)    PHQ-2     PHQ-2 Score: 0   Housing Stability: Not on file   Utilities: Not on  file   Health Literacy: Not on file        Allergies   Allergen Reactions    Clindamycin/Lincomycin Hives     hives and flushed    Latex        Review of Systems      Review of Systems     Review of Systems   Constitutional: Negative for chills and fever.   HENT: Negative for drooling and sneezing.    Eyes: Negative for redness.   Respiratory: Negative for cough and wheezing.    Gastrointestinal: Negative for vomiting.   Psychiatric/Behavioral: Negative for behavioral problems. The patient is not nervous/anxious.      All other systems negative.   Physical Exam   Physical Exam    Vitals and nursing note reviewed.  Constitutional:   Appearance. Normal Appearance.  /86   Ht 5' (1.524 m)   Wt 73.5 kg (162 lb)   LMP 02/21/2024 (Approximate)   BMI 31.64 kg/m²     Body mass index is 31.64 kg/m².   HENT:  Head: Atraumatic.  Nose: Nose normal  Eyes: Conjunctiva/sclera: Conjunctivae normal.  Cardiovascular:   Rate and Rhythm: Bilateral equal distal pulses  Pulmonary:   Effort: Pulmonary effort is normal  Skin:   General: Skin is warm and dry.  Neurological:   General: No focal deficit present.  Mental Status: Alert and oriented to person, place, and time.   Psychiatric:   Mood and Affect: mood normal.  Behavior: Behavior normal     Musculoskeletal Exam     Ortho Exam     Right Foot/ Ankle    INSPECTION:  Gait Erythema Ecchymosis Swelling Increased warmth   Not assessed Neg. Neg. Positive right lateral ankle and lateral dorsum foot.  Neg.     PALPATION/TENDERNESS:  AiTFL  2cm proximal-medial to tip lateral malleolus 92% sens, 29% spec ATFL CFL PTFL Deltoid   negative Positive. Neg. Neg. Neg.     Achilles Peroneal Tibialis Anterior Tibialis Posterior   negative Negative  Neg. Neg.     BONY TENDERNESS:    Proximal Fibula  (Maisonneuve frx) Medial Malleolus Lateral Malleolus   Negative Positive. Positive.     Base of 5th metatarsal Navicular:  Talar dome Calcaneal Squeeze   Neg. Neg. Positive. Neg.       RANGE OF  "MOTION: Limited  Dorsiflexion Plantarflexion Supination Pronation           STRENGTH:  Dorsiflexion Plantarflexion Pronation Supination           ACHILLES TENDON:  Palpable Gap or Defect of Achilles Angle of Declination Montalvo's Calf Squeeze Test Matles Test      patient prone, intact and symmetric plantarflexion of ankle when flexing knee   none        Special testing:  Anterior drawer test Talar tilt test Dorsiflexion (+) ER Stress Test:   (reproduce ATiFL mech; 71% sens, 63% spec) Tib-Fib Squeeze  anteromedial-to-  posterolateral squeeze; 26% sens, 88% spec; rule in test   With laxity and discomfort Without laxity but with discomfort N/a  Negative        Neurovascular:  Sensation to light touch Posterior tibial artery   Intact and equal bilaterally Intact and equal bilaterally     (Test not completed if space left blank )           Procedures       Portions of the record may have been created with voice recognition software. Occasional wrong word or \"sound alike\" substitutions may have occurred due to the inherent limitations of voice recognition software. Please review the chart carefully and recognize, using context, where substitutions/typographical errors may have occurred.   "

## 2024-03-06 NOTE — PATIENT INSTRUCTIONS
P.R.I.C.E. Treatment   WHAT YOU NEED TO KNOW:   What is P.R.I.C.E. treatment?  P.R.I.C.E. treatment is a 5-step process used to decrease swelling and pain caused by an injury. P.R.I.C.E. stands for protect, rest, ice, compress, and elevate. Start P.R.I.C.E. within 24 to 48 hours of an injury.  How do I use P.R.I.C.E. treatment?       Protect  your injury from more damage. Support the injured area with a brace or splint. Your healthcare provider will tell you how long to use the brace or splint.    Rest  your injured area as directed. You may need to stop using, or keep weight off, the injury for 48 hours or longer. Your healthcare provider may recommend crutches or another device. Return to your usual activities as directed.    Apply ice  on your injured area for 15 to 20 minutes every 4 hours or as directed. Use an ice pack, or put crushed ice in a plastic bag. Cover the bag with a towel before you apply it to your skin. Ice helps prevent tissue damage and decreases swelling and pain.    Compress  (keep pressure on) the injured area. Compression will help decrease swelling and support the injured area. Use an elastic bandage, air stirrup, splint, or sling as directed. If you use an elastic bandage, make sure the bandage is not too tight. You should be able to slip 2 fingers between the bandage and your skin.    Elevate  the injured area above the level of your heart as often as you can. This will help decrease swelling and pain. Prop the injured area on pillows or blankets to keep it elevated comfortably.  When should I seek immediate care?   Your pain is severe.    You have severe swelling or deformity.    You have numbness in the injured area.    When should I call my doctor?   Your pain and swelling do not go away after a few days.    You have questions or concerns about your condition or care.    CARE AGREEMENT:   You have the right to help plan your care. Learn about your health condition and how it may be  treated. Discuss treatment options with your healthcare providers to decide what care you want to receive. You always have the right to refuse treatment. The above information is an  only. It is not intended as medical advice for individual conditions or treatments. Talk to your doctor, nurse or pharmacist before following any medical regimen to see if it is safe and effective for you.  © Copyright [employer] 2023 Information is for End User's use only and may not be sold, redistributed or otherwise used for commercial purposes.  Ankle Exercises   WHAT YOU NEED TO KNOW:   What do I need to know about ankle exercises?  Ankle exercises help strengthen your ankle and improve its function after injury. These are beginning exercises. Ask your healthcare provider if you need to see a physical therapist for more advanced exercises.   What are some general guidelines for ankle exercises?   Do these exercises 3 to 5 days a week, or as directed by your healthcare provider.  Ask if you should do the exercises on each ankle.    Do the exercises in the order that your healthcare provider recommends.  This will help prevent swelling, chronic pain, and reinjury. Start with range of motion exercises. Then move to strengthening exercises, and finally to balancing exercises.    Warm up before you do ankle exercises.  Walk or ride a stationary bike for 5 to 10 minutes to prepare your ankle for movement.    Stop if you feel pain.  It is normal to feel some discomfort at first but you should not feel pain. Tell your doctor or physical therapist if you have pain while you exercise. Regular exercise will help decrease your discomfort over time.    How do I perform range of motion exercises safely?  Begin with range of motion exercises to improve flexibility. Ask your healthcare provider when you can progress to strengthening exercises.  Ankle alphabet:  Sit on a chair so that your feet do not touch the floor. Use your big toe  to write each letter of the alphabet. Use only your foot and ankle, and keep your movements small. Do 2 sets.         Calf stretches:      Sitting calf stretches with a towel:  Sit on the floor with both legs out straight in front of you. Loop a towel around the ball of your injured foot. Grasp the ends of the towel and pull it toward you. Keep your leg and back straight. Do not lean forward as you pull the towel. Hold for 30 seconds. Then relax for 30 seconds. Do 2 sets of 10.         Standing calf stretches:  Stand facing a wall with the foot that is not injured forward and your knee slightly bent. Keep the leg with the injured foot straight and behind you with your toes pointed in slightly. With both heels flat on the floor, press your hips forward. Do not arch your back. Hold for 30 seconds, and then relax for 30 seconds. Do 2 sets of 10. Repeat with your leg bent. Do 2 sets of 10.       How do I perform strengthening exercises safely?  After you can perform range of motion exercises without pain, you may begin strengthening exercises. Ask your healthcare provider when you can progress to balancing exercises.  Ankle movement in 4 directions:  Sit on the floor with your legs straight in front of you. Keep your heels on the floor for support.    Dorsiflexion:  Begin with your toes pointing straight up. Pull your toes toward your body. Slowly return to the starting position. Do 3 sets of 5.     Plantar flexion:  Begin with your toes pointing straight up. Push your toes away from your body. Slowly return to the starting position. Do 3 sets of 5.         Inversion:  Begin with your toes pointing straight up. Push your toes inward, toward each other. Slowly return to the starting position. Do 3 sets of 5.     Eversion:  Begin with your toes pointing straight up. Push your toes outward, away from each other. Slowly return to the starting position. Do 3 sets of 5.       Toe curls with a towel:  Sit on a chair so that  both of your feet are flat on the floor. Place a small towel on the floor in front of your injured foot. Grab the center of the towel with your toes and curl the towel toward you. Relax and repeat. Do 1 set of 5.         [place] pick-ups:  Sit on a chair so that both of your feet are flat on the floor. Place 20 marbles on the floor in front of your injured foot. Use your toes to  one marble at a time and place it into a bowl. Repeat until you have picked up all the marbles. Do 1 set.     Heel raises:      Single leg heel raises:  Stand with your weight evenly on both feet. Hold on to a chair or a wall for balance. Lift the foot that is not injured off the floor so all your weight is placed on your injured foot. Raise the heel of your injured foot as high as you can. Slowly lower your heel to the floor. Do 1 set of 10.         Double leg heel raises:  Stand with your weight evenly on both feet. Hold on to a chair or a wall for balance. Raise both of your heels as high as you can. Slowly lower your heels to the floor. Do 1 set of 10.       Heel and toe walks:      Heel walks:  Begin in a standing position. Lift your toes off the floor and walk on your heels. Keep your toes lifted as high as possible. Do 2 sets of 10.         Toe walks:  Begin in a standing position. Lift your heels off the floor and walk on the balls and toes of your feet. Keep your heels lifted as high as possible. Do 2 sets of 10.       How do I perform a balance exercise safely?  After you can perform strengthening exercises without pain, you may do this beginning balancing exercise. Ask your healthcare provider for more advanced balance exercises.  Single leg stance:  Stand with your weight evenly on both feet, or hold on to a chair or a wall. Do not lean to the side. Lift the foot that is not injured off the floor so all your weight is placed on your injured foot. Balance on your injured foot. Ask your healthcare provider how long to hold  this position.           When should I call my doctor or physical therapist?   You have new pain, or your pain becomes worse.    You have questions or concerns about your condition, care, or exercise program.    CARE AGREEMENT:   You have the right to help plan your care. Learn about your health condition and how it may be treated. Discuss treatment options with your healthcare providers to decide what care you want to receive. You always have the right to refuse treatment. The above information is an  only. It is not intended as medical advice for individual conditions or treatments. Talk to your doctor, nurse or pharmacist before following any medical regimen to see if it is safe and effective for you.  © Copyright [employer]ative 2023 Information is for End User's use only and may not be sold, redistributed or otherwise used for commercial purposes.

## 2024-03-20 ENCOUNTER — OFFICE VISIT (OUTPATIENT)
Dept: OBGYN CLINIC | Facility: CLINIC | Age: 24
End: 2024-03-20
Payer: COMMERCIAL

## 2024-03-20 VITALS
HEIGHT: 60 IN | BODY MASS INDEX: 31.8 KG/M2 | SYSTOLIC BLOOD PRESSURE: 140 MMHG | WEIGHT: 162 LBS | DIASTOLIC BLOOD PRESSURE: 86 MMHG

## 2024-03-20 DIAGNOSIS — S93.401D SPRAIN OF RIGHT ANKLE, UNSPECIFIED LIGAMENT, SUBSEQUENT ENCOUNTER: ICD-10-CM

## 2024-03-20 DIAGNOSIS — S99.911D INJURY OF RIGHT ANKLE, SUBSEQUENT ENCOUNTER: Primary | ICD-10-CM

## 2024-03-20 PROCEDURE — 99213 OFFICE O/P EST LOW 20 MIN: CPT | Performed by: FAMILY MEDICINE

## 2024-03-20 NOTE — PROGRESS NOTES
Assessment:     1. Injury of right ankle, subsequent encounter  Ambulatory Referral to Physical Therapy      2. Sprain of right ankle, unspecified ligament, subsequent encounter  Ambulatory Referral to Physical Therapy        Orders Placed This Encounter   Procedures    Ambulatory Referral to Physical Therapy        Impression:   Right ankle pain likely secondary to ankle sprain.    Date of injury: 03/04/2024  Mechanism of injury: Stood up/inversion  Follow-up from injury: 2 weeks and 2 days        Conservative Management   We discussed different treatment options:  Previously reviewed/discussed  Reviewed care now documentation completed on 03/05/2024.  Treatment plan consisted of x-rays.  In cam boot.  And referral to Ortho/sports medicine.  Ice or Heat Therapy as needed 1-2 times daily for 10-20 minutes. As tolerated.   Over the counter Tylenol and/or NSAIDs  as needed based off your Past Medical Hx. Please follow product label for dosing and maximum limits.    Formal Handout provided on General Information of PRICE  Continue with cam boot.  Offered crutches.  Patient declined.  Cam boot may be removed for sleep and hygiene  Today's discussion/review  Will wean out of cam boot over the next week.  Will transition into ankle lace up.  Handout provided on General Information of ankle exercises  Recommended supportive shoes.  Formal physical therapy prescription provided.     Imaging   All imaging from today was reviewed by myself and explained to the patient.   03/05/2024 right ankle x-ray: No acute osseous abnormality  03/05/2024 right foot x-ray: No acute osseous abnormality     Procedure  Not appropriate at this time.        Shared decision making, patient agreeable to plan.      Return for Follow up after 2 wks.    HPI:   Nikia Del Valle is a 24 y.o. female  who presents for evaluation of   Chief Complaint   Patient presents with    Right Ankle - Pain, Follow-up    Right Foot - Pain, Swelling, Follow-up     Today's  visit  Denies any new trauma.  Location: ATFL, cuboid, third metatarsal  Severity: Current severity: 0/10. Max severity: 5/10.  Pain described as: Throbbing  Radiation: Denies.  Provocative: Walking/ankle motion.  Associated symptoms: Resolving swelling.  Numbness and tingling in the foot has resolved.  Patient feels approximately 70% improved.  Has been able to trial some time outside of the boot.    Previous visit 03/06/2024.  Occupation: Seated position  Injury Related: Yes, Date of Injury: 03/04/2024     Onset/Mechanism: Patient stood up on Monday from her chair and rolled her right ankle.  Immediate pain.  Able to ambulate with a limp.  Location: Fifth metatarsal, lateral malleolus.  Severity: Current severity: 4/10. Max severity: 7/10.  Pain described as: Throbbing  Radiation: Will radiate up distal lower extremity.  Pain will radiate up into the knee  Provocative: Walking/weightbearing.  Associated symptoms: Minimal swelling.  Numbness and tingling in the foot     Denies any hx of fracture of affected limb.   Denies any surgical history of affected limb.       Summary of treatment to-date:   Cam boot  Rest  Ice therapy  Ibuprofen/Tylenol       Following History Reviewed and Updated     Past Medical History:   Diagnosis Date    Allergic rhinitis     Asthma     Environmental allergies     GERD (gastroesophageal reflux disease) 2001    Lyme disease     Mono exposure     Reflux esophagitis      Past Surgical History:   Procedure Laterality Date    ADENOIDECTOMY      LASIK  01/2021    NASAL SINUS SURGERY      TONSILLECTOMY  2001    UPPER GASTROINTESTINAL ENDOSCOPY       Family History   Problem Relation Age of Onset    Hyperthyroidism Mother     Clotting disorder Mother     Diabetes Father     No Known Problems Sister     Hyperthyroidism Maternal Grandmother     No Known Problems Maternal Grandfather     No Known Problems Paternal Grandmother     No Known Problems Paternal Grandfather        Social History      Substance and Sexual Activity   Alcohol Use Yes    Comment: socially      Social History     Substance and Sexual Activity   Drug Use Never     Social History     Tobacco Use   Smoking Status Never   Smokeless Tobacco Never       Social Determinants of Health     Tobacco Use: Low Risk  (3/20/2024)    Patient History     Smoking Tobacco Use: Never     Smokeless Tobacco Use: Never     Passive Exposure: Not on file   Alcohol Use: Not on file   Financial Resource Strain: Not on file   Food Insecurity: Not on file   Transportation Needs: Not on file   Physical Activity: Not on file   Stress: Not on file   Social Connections: Not on file   Intimate Partner Violence: Not on file   Depression: Not at risk (9/18/2023)    PHQ-2     PHQ-2 Score: 0   Housing Stability: Not on file   Utilities: Not on file   Health Literacy: Not on file        Allergies   Allergen Reactions    Clindamycin/Lincomycin Hives     hives and flushed    Latex        Review of Systems      Review of Systems     Review of Systems   Constitutional: Negative for chills and fever.   HENT: Negative for drooling and sneezing.    Eyes: Negative for redness.   Respiratory: Negative for cough and wheezing.    Gastrointestinal: Negative for vomiting.   Psychiatric/Behavioral: Negative for behavioral problems. The patient is not nervous/anxious.      All other systems negative.   Physical Exam   Physical Exam    Vitals and nursing note reviewed.  Constitutional:   Appearance. Normal Appearance.  /86   Ht 5' (1.524 m)   Wt 73.5 kg (162 lb)   LMP 02/21/2024 (Approximate)   BMI 31.64 kg/m²     Body mass index is 31.64 kg/m².   HENT:  Head: Atraumatic.  Nose: Nose normal  Eyes: Conjunctiva/sclera: Conjunctivae normal.  Cardiovascular:   Rate and Rhythm: Bilateral equal distal pulses  Pulmonary:   Effort: Pulmonary effort is normal  Skin:   General: Skin is warm and dry.  Neurological:   General: No focal deficit present.  Mental Status: Alert and  "oriented to person, place, and time.   Psychiatric:   Mood and Affect: mood normal.  Behavior: Behavior normal     Musculoskeletal Exam     Ortho Exam     Right ankle    INSPECTION:  Gait Erythema Ecchymosis Swelling Increased warmth   Not assessed Neg. Neg. Improved.  Neg.     PALPATION/TENDERNESS:  AiTFL  2cm proximal-medial to tip lateral malleolus 92% sens, 29% spec ATFL CFL PTFL Deltoid   negative Positive reproducing chief complaint. Neg. Neg. Neg.     Achilles Peroneal Tibialis Anterior Tibialis Posterior   negative Negative  Neg. Neg.     BONY TENDERNESS:  Cuboid: Tenderness  Third metatarsal: Tenderness  Base of fifth: Tenderness  Proximal Fibula  (Maisonneuve frx) Medial Malleolus Lateral Malleolus   Negative Neg. Discomfort.     Base of 5th metatarsal Navicular:  Talar dome Calcaneal Squeeze   Neg. Neg. Neg. Neg.       RANGE OF MOTION  Dorsiflexion Plantarflexion Supination Pronation   Full, Intact Full, Intact Intact Intact     STRENGTH:  Dorsiflexion Plantarflexion Pronation Supination   Intact minimal pain Intact with discomfort Intact with discomfort Intact minimal pain     ACHILLES TENDON:  Palpable Gap or Defect of Achilles Matles Test    patient prone, intact and symmetric plantarflexion of ankle when flexing knee   none      Special testing:  Anterior drawer test Talar tilt test Dorsiflexion (+) ER Stress Test:   (reproduce ATiFL mech; 71% sens, 63% spec) Tib-Fib Squeeze  anteromedial-to-  posterolateral squeeze; 26% sens, 88% spec; rule in test   With mild laxity and discomfort reproducing chief complaint Without laxity or pain of the CFL Minimal discomfort Negative          Neurovascular:  Sensation to light touch Posterior tibial artery   Intact and equal bilaterally Intact and equal bilaterally     (Test not completed if space left blank )           Procedures       Portions of the record may have been created with voice recognition software. Occasional wrong word or \"sound alike\" " substitutions may have occurred due to the inherent limitations of voice recognition software. Please review the chart carefully and recognize, using context, where substitutions/typographical errors may have occurred.

## 2024-03-20 NOTE — PATIENT INSTRUCTIONS
Ankle Exercises   WHAT YOU NEED TO KNOW:   What do I need to know about ankle exercises?  Ankle exercises help strengthen your ankle and improve its function after injury. These are beginning exercises. Ask your healthcare provider if you need to see a physical therapist for more advanced exercises.   What are some general guidelines for ankle exercises?   Do these exercises 3 to 5 days a week, or as directed by your healthcare provider.  Ask if you should do the exercises on each ankle.    Do the exercises in the order that your healthcare provider recommends.  This will help prevent swelling, chronic pain, and reinjury. Start with range of motion exercises. Then move to strengthening exercises, and finally to balancing exercises.    Warm up before you do ankle exercises.  Walk or ride a stationary bike for 5 to 10 minutes to prepare your ankle for movement.    Stop if you feel pain.  It is normal to feel some discomfort at first but you should not feel pain. Tell your doctor or physical therapist if you have pain while you exercise. Regular exercise will help decrease your discomfort over time.    How do I perform range of motion exercises safely?  Begin with range of motion exercises to improve flexibility. Ask your healthcare provider when you can progress to strengthening exercises.  Ankle alphabet:  Sit on a chair so that your feet do not touch the floor. Use your big toe to write each letter of the alphabet. Use only your foot and ankle, and keep your movements small. Do 2 sets.         Calf stretches:      Sitting calf stretches with a towel:  Sit on the floor with both legs out straight in front of you. Loop a towel around the ball of your injured foot. Grasp the ends of the towel and pull it toward you. Keep your leg and back straight. Do not lean forward as you pull the towel. Hold for 30 seconds. Then relax for 30 seconds. Do 2 sets of 10.         Standing calf stretches:  Stand facing a wall with the  foot that is not injured forward and your knee slightly bent. Keep the leg with the injured foot straight and behind you with your toes pointed in slightly. With both heels flat on the floor, press your hips forward. Do not arch your back. Hold for 30 seconds, and then relax for 30 seconds. Do 2 sets of 10. Repeat with your leg bent. Do 2 sets of 10.       How do I perform strengthening exercises safely?  After you can perform range of motion exercises without pain, you may begin strengthening exercises. Ask your healthcare provider when you can progress to balancing exercises.  Ankle movement in 4 directions:  Sit on the floor with your legs straight in front of you. Keep your heels on the floor for support.    Dorsiflexion:  Begin with your toes pointing straight up. Pull your toes toward your body. Slowly return to the starting position. Do 3 sets of 5.     Plantar flexion:  Begin with your toes pointing straight up. Push your toes away from your body. Slowly return to the starting position. Do 3 sets of 5.         Inversion:  Begin with your toes pointing straight up. Push your toes inward, toward each other. Slowly return to the starting position. Do 3 sets of 5.     Eversion:  Begin with your toes pointing straight up. Push your toes outward, away from each other. Slowly return to the starting position. Do 3 sets of 5.       Toe curls with a towel:  Sit on a chair so that both of your feet are flat on the floor. Place a small towel on the floor in front of your injured foot. Grab the center of the towel with your toes and curl the towel toward you. Relax and repeat. Do 1 set of 5.         [place] pick-ups:  Sit on a chair so that both of your feet are flat on the floor. Place 20 marbles on the floor in front of your injured foot. Use your toes to  one marble at a time and place it into a bowl. Repeat until you have picked up all the marbles. Do 1 set.     Heel raises:      Single leg heel raises:  Stand  with your weight evenly on both feet. Hold on to a chair or a wall for balance. Lift the foot that is not injured off the floor so all your weight is placed on your injured foot. Raise the heel of your injured foot as high as you can. Slowly lower your heel to the floor. Do 1 set of 10.         Double leg heel raises:  Stand with your weight evenly on both feet. Hold on to a chair or a wall for balance. Raise both of your heels as high as you can. Slowly lower your heels to the floor. Do 1 set of 10.       Heel and toe walks:      Heel walks:  Begin in a standing position. Lift your toes off the floor and walk on your heels. Keep your toes lifted as high as possible. Do 2 sets of 10.         Toe walks:  Begin in a standing position. Lift your heels off the floor and walk on the balls and toes of your feet. Keep your heels lifted as high as possible. Do 2 sets of 10.       How do I perform a balance exercise safely?  After you can perform strengthening exercises without pain, you may do this beginning balancing exercise. Ask your healthcare provider for more advanced balance exercises.  Single leg stance:  Stand with your weight evenly on both feet, or hold on to a chair or a wall. Do not lean to the side. Lift the foot that is not injured off the floor so all your weight is placed on your injured foot. Balance on your injured foot. Ask your healthcare provider how long to hold this position.           When should I call my doctor or physical therapist?   You have new pain, or your pain becomes worse.    You have questions or concerns about your condition, care, or exercise program.    CARE AGREEMENT:   You have the right to help plan your care. Learn about your health condition and how it may be treated. Discuss treatment options with your healthcare providers to decide what care you want to receive. You always have the right to refuse treatment. The above information is an  only. It is not intended  as medical advice for individual conditions or treatments. Talk to your doctor, nurse or pharmacist before following any medical regimen to see if it is safe and effective for you.  © Copyright [employer] 2023 Information is for End User's use only and may not be sold, redistributed or otherwise used for commercial purposes.

## 2024-04-11 NOTE — PROGRESS NOTES
Assessment:     1. Injury of right ankle, subsequent encounter  MRI foot/forefoot toes right wo contrast      2. Sprain of right ankle, unspecified ligament, subsequent encounter  MRI foot/forefoot toes right wo contrast      3. Right foot pain  MRI foot/forefoot toes right wo contrast        Orders Placed This Encounter   Procedures    MRI foot/forefoot toes right wo contrast        Impression:   Right ankle pain likely secondary to resolved ankle sprain.    Right foot pain likely secondary to concerns for bone contusion, occult fracture, Li's neuroma  Date of injury: 03/04/2024  Mechanism of injury: Stood up/inversion  Follow-up from injury: 5 weeks and 4 days        Conservative Management   We discussed different treatment options:  Previously reviewed/discussed  Reviewed care now documentation completed on 03/05/2024.  Treatment plan consisted of x-rays.  In cam boot.  And referral to Ortho/sports medicine.  Ice or Heat Therapy as needed 1-2 times daily for 10-20 minutes. As tolerated.   Over the counter Tylenol and/or NSAIDs  as needed based off your Past Medical Hx. Please follow product label for dosing and maximum limits.    Formal Handout provided on General Information of PRICE  Continue with cam boot.  Offered crutches.  Patient declined.  Cam boot may be removed for sleep and hygiene.  Patient weaned out of the cam boot and weaned out of ankle lace up.  Today's discussion/review  Patient has been doing home exercise program.  And program with formal physical therapist at gym.  Recommended supportive shoes.  Recommended orthotics.  Formal physical therapy prescription provided.  Pending right foot MRI     Imaging   All imaging from today was reviewed by myself and explained to the patient.   03/05/2024 right ankle x-ray: No acute osseous abnormality  03/05/2024 right foot x-ray: No acute osseous abnormality     Procedure  Not appropriate at this time.     Shared decision making, patient agreeable to  plan.      Return for Follow up after completion of advanced imaging.    HPI:   Nikia Del Valle is a 24 y.o. female  who presents for evaluation of   Chief Complaint   Patient presents with    Right Ankle - Follow-up, Pain    Right Foot - Follow-up, Pain       Today's visit  Denies any new trauma  Location: Fourth and fifth metatarsal, lateral ankle pain is all better.  Severity: Current severity: 4-5/10. Max severity: 7/10.  Pain described as: Throbbing  Radiation: Denies pain radiating..  Provocative: Walking.  Resting.  Associated symptoms: [place] resolved.  Numbness tingling resolved..  Patient feels 100% improved from the lateral ankle pain.  But only 70% improved from the foot pain.    Previous visit 03/06/2024. And 03/20/2024   Occupation: Seated position  Injury Related: Yes, Date of Injury: 03/04/2024     Onset/Mechanism: Patient stood up on Monday from her chair and rolled her right ankle.  Immediate pain.  Able to ambulate with a limp.  Location: Fifth metatarsal, lateral malleolus.  Severity: Current severity: 4/10. Max severity: 7/10.  Pain described as: Throbbing  Radiation: Will radiate up distal lower extremity.  Pain will radiate up into the knee  Provocative: Walking/weightbearing.  Associated symptoms: Minimal swelling.  Numbness and tingling in the foot      Denies any hx of fracture of affected limb.   Denies any surgical history of affected limb.       Summary of treatment to-date:   Cam boot  Rest  Ice therapy  Ibuprofen/Tylenol    Following History Reviewed and Updated     Past Medical History:   Diagnosis Date    Allergic rhinitis     Asthma     Environmental allergies     GERD (gastroesophageal reflux disease) 2001    Lyme disease     Mono exposure     Reflux esophagitis      Past Surgical History:   Procedure Laterality Date    ADENOIDECTOMY      LASIK  01/2021    NASAL SINUS SURGERY      TONSILLECTOMY  2001    UPPER GASTROINTESTINAL ENDOSCOPY       Family History   Problem Relation Age of  Onset    Hyperthyroidism Mother     Clotting disorder Mother     Diabetes Father     No Known Problems Sister     Hyperthyroidism Maternal Grandmother     No Known Problems Maternal Grandfather     No Known Problems Paternal Grandmother     No Known Problems Paternal Grandfather        Social History     Substance and Sexual Activity   Alcohol Use Yes    Comment: socially      Social History     Substance and Sexual Activity   Drug Use Never     Social History     Tobacco Use   Smoking Status Never   Smokeless Tobacco Never       Social Determinants of Health     Tobacco Use: Low Risk  (4/12/2024)    Patient History     Smoking Tobacco Use: Never     Smokeless Tobacco Use: Never     Passive Exposure: Not on file   Alcohol Use: Not on file   Financial Resource Strain: Not on file   Food Insecurity: Not on file   Transportation Needs: Not on file   Physical Activity: Not on file   Stress: Not on file   Social Connections: Not on file   Intimate Partner Violence: Not on file   Depression: Not at risk (9/18/2023)    PHQ-2     PHQ-2 Score: 0   Housing Stability: Not on file   Utilities: Not on file   Health Literacy: Not on file        Allergies   Allergen Reactions    Clindamycin/Lincomycin Hives     hives and flushed    Latex        Review of Systems      Review of Systems     Review of Systems   Constitutional: Negative for chills and fever.   HENT: Negative for drooling and sneezing.    Eyes: Negative for redness.   Respiratory: Negative for cough and wheezing.    Gastrointestinal: Negative for vomiting.   Psychiatric/Behavioral: Negative for behavioral problems. The patient is not nervous/anxious.      All other systems negative.   Physical Exam   Physical Exam    Vitals and nursing note reviewed.  Constitutional:   Appearance. Normal Appearance.  /86   Ht 5' (1.524 m)   Wt 73.5 kg (162 lb)   BMI 31.64 kg/m²     Body mass index is 31.64 kg/m².   HENT:  Head: Atraumatic.  Nose: Nose normal  Eyes:  Conjunctiva/sclera: Conjunctivae normal.  Cardiovascular:   Rate and Rhythm: Bilateral equal distal pulses  Pulmonary:   Effort: Pulmonary effort is normal  Skin:   General: Skin is warm and dry.  Neurological:   General: No focal deficit present.  Mental Status: Alert and oriented to person, place, and time.   Psychiatric:   Mood and Affect: mood normal.  Behavior: Behavior normal     Musculoskeletal Exam     Ortho Exam     Right Foot/ Ankle    INSPECTION:  Gait Erythema Ecchymosis Swelling Increased warmth    normal Neg. Neg. Neg.  Neg.     PALPATION/TENDERNESS:  AiTFL  2cm proximal-medial to tip lateral malleolus 92% sens, 29% spec ATFL CFL PTFL Deltoid   negative Neg. Neg. Neg. Neg.     Achilles Peroneal Tibialis Anterior Tibialis Posterior   negative Negative  Neg. Neg.     BONY TENDERNESS:  2 - 4th metatarsal   Proximal Fibula  ([place] frx) Medial Malleolus Lateral Malleolus   Negative Neg. Neg.     Base of 5th metatarsal Navicular:  Talar dome Calcaneal Squeeze   Neg. Neg. Neg. Neg.       RANGE OF MOTION  Dorsiflexion Plantarflexion Supination Pronation   Full, Intact Full, Intact Intact Intact     STRENGTH:  Dorsiflexion Plantarflexion Pronation Supination   Intact without pain Intact without pain Intact without pain Intact without pain     ACHILLES TENDON:  Palpable Gap or Defect of Achilles Angle of Declination Montalvo's Calf Squeeze Test Matles Test      patient prone, intact and symmetric plantarflexion of ankle when flexing knee   none      Special testing:  Anterior drawer test Talar tilt test Dorsiflexion (+) ER Stress Test:   (reproduce ATiFL mech; 71% sens, 63% spec) Tib-Fib Squeeze  anteromedial-to-  posterolateral squeeze; 26% sens, 88% spec; rule in test   Without anterior translation or pain Without laxity or pain of the CFL Negative  Negative      Special testing:  Single-leg squat Single-leg hop  Toe walking Heel walking   Able to complete with mild discomfort Unable to complete  able to  "complete with discomfort able to complete without discomfort     Neurovascular:  Sensation to light touch Posterior tibial artery   Intact and equal bilaterally Intact and equal bilaterally     (Test not completed if space left blank )           Procedures       Portions of the record may have been created with voice recognition software. Occasional wrong word or \"sound alike\" substitutions may have occurred due to the inherent limitations of voice recognition software. Please review the chart carefully and recognize, using context, where substitutions/typographical errors may have occurred.   "

## 2024-04-12 ENCOUNTER — OFFICE VISIT (OUTPATIENT)
Dept: OBGYN CLINIC | Facility: CLINIC | Age: 24
End: 2024-04-12
Payer: COMMERCIAL

## 2024-04-12 VITALS
BODY MASS INDEX: 31.8 KG/M2 | WEIGHT: 162 LBS | HEIGHT: 60 IN | SYSTOLIC BLOOD PRESSURE: 140 MMHG | DIASTOLIC BLOOD PRESSURE: 86 MMHG

## 2024-04-12 DIAGNOSIS — M79.671 RIGHT FOOT PAIN: ICD-10-CM

## 2024-04-12 DIAGNOSIS — S99.911D INJURY OF RIGHT ANKLE, SUBSEQUENT ENCOUNTER: Primary | ICD-10-CM

## 2024-04-12 DIAGNOSIS — S93.401D SPRAIN OF RIGHT ANKLE, UNSPECIFIED LIGAMENT, SUBSEQUENT ENCOUNTER: ICD-10-CM

## 2024-04-12 PROCEDURE — 99213 OFFICE O/P EST LOW 20 MIN: CPT | Performed by: FAMILY MEDICINE

## 2024-04-23 DIAGNOSIS — F41.9 ANXIETY: ICD-10-CM

## 2024-04-24 RX ORDER — DULOXETIN HYDROCHLORIDE 30 MG/1
30 CAPSULE, DELAYED RELEASE ORAL DAILY
Qty: 90 CAPSULE | Refills: 1 | Status: SHIPPED | OUTPATIENT
Start: 2024-04-24

## 2024-08-16 ENCOUNTER — OFFICE VISIT (OUTPATIENT)
Dept: OBGYN CLINIC | Facility: CLINIC | Age: 24
End: 2024-08-16

## 2024-08-16 VITALS
SYSTOLIC BLOOD PRESSURE: 133 MMHG | HEART RATE: 105 BPM | BODY MASS INDEX: 32.83 KG/M2 | DIASTOLIC BLOOD PRESSURE: 105 MMHG | HEIGHT: 60 IN | WEIGHT: 167.2 LBS

## 2024-08-16 DIAGNOSIS — Z12.4 CERVICAL CANCER SCREENING: ICD-10-CM

## 2024-08-16 DIAGNOSIS — Z01.419 WOMEN'S ANNUAL ROUTINE GYNECOLOGICAL EXAMINATION: Primary | ICD-10-CM

## 2024-08-16 PROCEDURE — 88175 CYTOPATH C/V AUTO FLUID REDO: CPT | Performed by: NURSE PRACTITIONER

## 2024-08-16 PROCEDURE — 99385 PREV VISIT NEW AGE 18-39: CPT | Performed by: NURSE PRACTITIONER

## 2024-08-16 NOTE — PROGRESS NOTES
ANNUAL GYNECOLOGICAL EXAMINATION    Nikia Del Valle is a 24 y.o. female who presents today for annual GYN exam.  Her last pap smear was performed  and result was NILM.  She reports no history of abnormal pap smears in her past. She has not had HIV screening performed.  She reports menses as normal.  Patient's last menstrual period was 2024 (approximate).  Her general medical history has been reviewed and she reports it as follows:    Past Medical History:   Diagnosis Date    Allergic rhinitis     Asthma     Environmental allergies     GERD (gastroesophageal reflux disease)     Lyme disease     Mono exposure     Reflux esophagitis      Past Surgical History:   Procedure Laterality Date    ADENOIDECTOMY      LASIK  2021    NASAL SINUS SURGERY      TONSILLECTOMY      UPPER GASTROINTESTINAL ENDOSCOPY       OB History          0    Para   0    Term   0       0    AB   0    Living   0         SAB   0    IAB   0    Ectopic   0    Multiple   0    Live Births   0               Social History     Tobacco Use    Smoking status: Never    Smokeless tobacco: Never   Vaping Use    Vaping status: Never Used   Substance Use Topics    Alcohol use: Yes     Comment: socially     Drug use: Never     Social History     Substance and Sexual Activity   Sexual Activity Yes    Partners: Male    Birth control/protection: Condom Male, I.U.D.     Cancer-related family history is negative for Breast cancer.    Current Outpatient Medications   Medication Instructions    albuterol (Ventolin HFA) 90 mcg/act inhaler 2 puffs, Inhalation, Every 4 hours PRN    amphetamine-dextroamphetamine (ADDERALL) 15 MG tablet 15 mg, Daily    cetirizine (ZYRTEC) 10 mg, As needed    dicyclomine (BENTYL) 10 mg capsule take 1 capsule by mouth four times a day before MEALS AND AT BEDTIME    DULoxetine (CYMBALTA) 30 mg, Oral, Daily    EPINEPHrine (EPIPEN) 0.3 mg, Intramuscular, Once    fluticasone (FLONASE) 50 mcg/act nasal spray 1 spray,  Daily PRN    hyoscyamine (LEVSIN/SL) 0.125 mg, Oral, Every 6 hours PRN    levonorgestrel (KYLEENA) 19.5 MG intrauterine device 1 Intra Uterine Device, Intrauterine, Once    polyethylene glycol (Golytely) 4000 mL solution 4,000 mL, Oral, Once, Take 4000 mL by mouth once for 1 dose. Use as directed       Review of Systems:  Review of Systems   Constitutional: Negative.    Gastrointestinal: Negative.    Genitourinary: Negative.    Skin: Negative.        Physical Exam:  BP (!) 133/105 (BP Location: Right arm, Patient Position: Sitting)   Pulse 105   Ht 5' (1.524 m)   Wt 75.8 kg (167 lb 3.2 oz)   LMP 07/20/2024 (Approximate)   BMI 32.65 kg/m²   Physical Exam  Constitutional:       General: She is not in acute distress.     Appearance: Normal appearance.   Genitourinary:      Vulva and bladder normal.      No lesions in the vagina.      No vaginal erythema or ulceration.        Right Adnexa: not tender and no mass present.     Left Adnexa: not tender and no mass present.     No cervical motion tenderness or lesion.      IUD strings visualized.      Uterus is not enlarged or tender.      No uterine mass detected.  Breasts:     Right: No mass, nipple discharge or skin change.      Left: No mass, nipple discharge or skin change.   Cardiovascular:      Rate and Rhythm: Normal rate and regular rhythm.   Pulmonary:      Effort: Pulmonary effort is normal.      Breath sounds: Normal breath sounds.   Abdominal:      General: Abdomen is flat.      Palpations: Abdomen is soft.   Musculoskeletal:      Cervical back: Neck supple.   Neurological:      Mental Status: She is alert.   Skin:     General: Skin is warm and dry.   Psychiatric:         Mood and Affect: Mood normal.         Behavior: Behavior normal.   Vitals reviewed.           Assessment/Plan:   1. Normal well-woman GYN exam.  2. Cervical cancer screening:  Normal cervical exam.  Pap smear done.  Has received two doses of HPV vaccine in 2021, declines third dose today.     3. STD screening:  Patient declines.   4. Breast cancer screening:  Reviewed breast self-awareness.   5. Depression Screening: Patient's depression screening was assessed with a PHQ-2 score of 0. Their PHQ-9 score was 3. Clinically patient does not have depression. No treatment is required.     6. BMI Counseling: Body mass index is 32.65 kg/m². Discussed the patient's BMI with her. The BMI is above normal. Exercise recommendations include exercising 3-5 times per week.   7. Contraception:  Kyleena IUD in place since 2021, effective until 2026. She is eventually considering removal and return to OCP use due to significantly decreased libido since insertion. Discussed removal procedure and encouraged to call office for appointment when she is ready to proceed with this.    8. Return to office in one year for annual or sooner if needed.    Reviewed with patient that [facility], but that they will not necessarily be reviewed by me immediately.  Explained that I will review results at my earliest opportunity and contact patient appropriately

## 2024-08-22 LAB
LAB AP GYN PRIMARY INTERPRETATION: NORMAL
Lab: NORMAL

## 2024-08-26 ENCOUNTER — TELEPHONE (OUTPATIENT)
Dept: OBGYN CLINIC | Facility: CLINIC | Age: 24
End: 2024-08-26

## 2024-08-26 NOTE — TELEPHONE ENCOUNTER
Called pt and informed of results pt verbalized understanding      ----- Message from ZOEY Carlson sent at 8/22/2024  1:55 PM EDT -----  Please let her know the pap is normal. Thank you!

## 2024-08-30 ENCOUNTER — OFFICE VISIT (OUTPATIENT)
Dept: OBGYN CLINIC | Facility: CLINIC | Age: 24
End: 2024-08-30

## 2024-08-30 VITALS
WEIGHT: 167 LBS | BODY MASS INDEX: 32.79 KG/M2 | HEIGHT: 60 IN | HEART RATE: 100 BPM | DIASTOLIC BLOOD PRESSURE: 78 MMHG | SYSTOLIC BLOOD PRESSURE: 122 MMHG

## 2024-08-30 DIAGNOSIS — Z30.09 BIRTH CONTROL COUNSELING: ICD-10-CM

## 2024-08-30 DIAGNOSIS — Z30.432 ENCOUNTER FOR IUD REMOVAL: Primary | ICD-10-CM

## 2024-08-30 PROCEDURE — 58301 REMOVE INTRAUTERINE DEVICE: CPT | Performed by: NURSE PRACTITIONER

## 2024-08-30 RX ORDER — NORETHINDRONE ACETATE AND ETHINYL ESTRADIOL 1MG-20(21)
1 KIT ORAL DAILY
Qty: 84 TABLET | Refills: 3 | Status: SHIPPED | OUTPATIENT
Start: 2024-08-30

## 2024-08-30 NOTE — PROGRESS NOTES
IUD Procedure    Date/Time: 8/30/2024 9:15 AM    Performed by: ZOEY Carlson  Authorized by: ZOEY Carlson    Verbal consent obtained?: Yes    Written consent obtained?: Yes    Risks and benefits: Risks, benefits and alternatives were discussed    Consent given by:  Patient  Time Out:     Time out: Immediately prior to the procedure a time out was called    Patient states understanding of procedure being performed: Yes    Patient's understanding of procedure matches consent: Yes    Procedure consent matches procedure scheduled: Yes    Relevant documents present and verified: Yes    Patient identity confirmed:  Verbally with patient  Select procedure: IUD removal    IUD Removal:     Reason for removal: patient request      Removed without complications: yes      Strings visualized: yes      IUD intact: yes    Removal Comments:      Patient presents today for IUD removal. She desires to return to OCP use. Reviewed risk/benefit, instruction for use and expected bleeding pattern. Rx sent to pharmacy. Return for annual exam or sooner if needed.

## 2024-09-13 ENCOUNTER — OFFICE VISIT (OUTPATIENT)
Dept: FAMILY MEDICINE CLINIC | Facility: CLINIC | Age: 24
End: 2024-09-13
Payer: COMMERCIAL

## 2024-09-13 VITALS
DIASTOLIC BLOOD PRESSURE: 86 MMHG | HEIGHT: 60 IN | HEART RATE: 98 BPM | RESPIRATION RATE: 16 BRPM | SYSTOLIC BLOOD PRESSURE: 124 MMHG | BODY MASS INDEX: 34.16 KG/M2 | OXYGEN SATURATION: 98 % | TEMPERATURE: 99.2 F | WEIGHT: 174 LBS

## 2024-09-13 DIAGNOSIS — F19.982 DRUG INDUCED INSOMNIA (HCC): ICD-10-CM

## 2024-09-13 DIAGNOSIS — U07.1 COVID-19: ICD-10-CM

## 2024-09-13 PROCEDURE — 99213 OFFICE O/P EST LOW 20 MIN: CPT | Performed by: NURSE PRACTITIONER

## 2024-09-13 RX ORDER — PREDNISONE 50 MG/1
50 TABLET ORAL DAILY
Qty: 5 TABLET | Refills: 0 | Status: SHIPPED | OUTPATIENT
Start: 2024-09-13 | End: 2024-09-18

## 2024-09-13 RX ORDER — FLUTICASONE PROPIONATE 50 MCG
1 SPRAY, SUSPENSION (ML) NASAL DAILY
Qty: 16 G | Refills: 0 | Status: SHIPPED | OUTPATIENT
Start: 2024-09-13

## 2024-09-13 NOTE — ASSESSMENT & PLAN NOTE
- Tested positive yesterday.   - Prescriptions sent for prednisone and Flonase.  - Discussed additional supportive care.  - Can return to work if symptoms improving and fever free for 24 hours.  Orders:    predniSONE 50 mg tablet; Take 1 tablet (50 mg total) by mouth daily for 5 days    fluticasone (FLONASE) 50 mcg/act nasal spray; 1 spray into each nostril daily

## 2024-09-13 NOTE — PROGRESS NOTES
Ambulatory Visit  Name: Nikia Del Valle      : 2000      MRN: [id number]  Encounter Provider: ZOEY Alvarez  Encounter Date: 2024   Encounter department: [facility]    Assessment & Plan  COVID-19  - Tested positive yesterday.   - Prescriptions sent for prednisone and Flonase.  - Discussed additional supportive care.  - Can return to work if symptoms improving and fever free for 24 hours.  Orders:    predniSONE 50 mg tablet; Take 1 tablet (50 mg total) by mouth daily for 5 days    fluticasone (FLONASE) 50 mcg/act nasal spray; 1 spray into each nostril daily    Drug induced insomnia (HCC)  - resolved              History of Present Illness     Patient presents to office today with complaints of cough, congestion, sore throat, fever and body aches. Symptoms started on Tuesday. She tested positive for COVID yesterday at home. Highest temperature was 101.0 on Tuesday. Denies any other concerns or complaints today.        Review of Systems   Constitutional:  Positive for fever. Negative for fatigue.   HENT:  Positive for congestion, sinus pressure, sinus pain and sore throat. Negative for trouble swallowing.    Eyes:  Negative for visual disturbance.   Respiratory:  Positive for cough. Negative for shortness of breath.    Cardiovascular:  Negative for chest pain and palpitations.   Gastrointestinal:  Negative for abdominal pain and blood in stool.   Endocrine: Negative for cold intolerance and heat intolerance.   Genitourinary:  Negative for difficulty urinating and dysuria.   Musculoskeletal:  Positive for myalgias. Negative for gait problem.   Skin:  Negative for rash.   Neurological:  Negative for dizziness, syncope and headaches.   Hematological:  Negative for adenopathy.   Psychiatric/Behavioral:  Negative for behavioral problems.      Past Medical History:   Diagnosis Date    Allergic rhinitis     Asthma     Environmental allergies     GERD (gastroesophageal reflux  disease) 2001    Lyme disease     Mono exposure     Reflux esophagitis      Past Surgical History:   Procedure Laterality Date    ADENOIDECTOMY      LASIK  01/2021    NASAL SINUS SURGERY      TONSILLECTOMY  2001    UPPER GASTROINTESTINAL ENDOSCOPY       Family History   Problem Relation Age of Onset    Hyperthyroidism Mother     Clotting disorder Mother     Diabetes Father     No Known Problems Sister     Hyperthyroidism Maternal Grandmother     No Known Problems Maternal Grandfather     No Known Problems Paternal Grandmother     No Known Problems Paternal Grandfather     Breast cancer Neg Hx      Social History     Tobacco Use    Smoking status: Never    Smokeless tobacco: Never   Vaping Use    Vaping status: Never Used   Substance and Sexual Activity    Alcohol use: Yes     Comment: socially     Drug use: Never    Sexual activity: Yes     Partners: Male     Birth control/protection: Condom Male, I.U.D.     Current Outpatient Medications on File Prior to Visit   Medication Sig    albuterol (Ventolin HFA) 90 mcg/act inhaler Inhale 2 puffs every 4 (four) hours as needed for wheezing    DULoxetine (CYMBALTA) 30 mg delayed release capsule Take 1 capsule (30 mg total) by mouth daily    amphetamine-dextroamphetamine (ADDERALL) 15 MG tablet Take 15 mg by mouth daily  (Patient not taking: Reported on 3/5/2024)    cetirizine (ZyrTEC) 10 mg tablet Take 10 mg by mouth as needed for allergies (Patient not taking: Reported on 3/5/2024)    EPINEPHrine (EPIPEN) 0.3 mg/0.3 mL SOAJ Inject 0.3 mL (0.3 mg total) into a muscle once for 1 dose (Patient taking differently: Inject 0.3 mg into a muscle once As needed)    norethindrone-ethinyl estradiol (Junel FE 1/20) 1-20 MG-MCG per tablet Take 1 tablet by mouth daily    [DISCONTINUED] dicyclomine (BENTYL) 10 mg capsule take 1 capsule by mouth four times a day before MEALS AND AT BEDTIME    [DISCONTINUED] fluticasone (FLONASE) 50 mcg/act nasal spray 1 spray into each nostril daily as  needed for rhinitis (Patient not taking: Reported on 8/16/2024)    [DISCONTINUED] hyoscyamine (LEVSIN/SL) 0.125 mg SL tablet Take 1 tablet (0.125 mg total) by mouth every 6 (six) hours as needed for cramping    [DISCONTINUED] polyethylene glycol (Golytely) 4000 mL solution Take 4,000 mL by mouth once for 1 dose Take 4000 mL by mouth once for 1 dose. Use as directed     Allergies   Allergen Reactions    Clindamycin/Lincomycin Hives     hives and flushed    Latex      Immunization History   Administered Date(s) Administered    HPV9 07/09/2021, 09/10/2021     Objective     /86 (BP Location: Left arm, Patient Position: Sitting, Cuff Size: Large)   Pulse 98   Temp 99.2 °F (37.3 °C) (Tympanic)   Resp 16   Ht 5' (1.524 m)   Wt 78.9 kg (174 lb)   LMP 08/19/2024 (Approximate)   SpO2 98%   BMI 33.98 kg/m²     Physical Exam  Vitals and nursing note reviewed.   Constitutional:       General: She is not in acute distress.     Appearance: Normal appearance. She is ill-appearing.   HENT:      Head: Normocephalic and atraumatic.      Right Ear: External ear normal.      Left Ear: External ear normal.      Nose: Congestion present.      Mouth/Throat:      Mouth: Mucous membranes are moist.   Eyes:      Conjunctiva/sclera: Conjunctivae normal.   Cardiovascular:      Rate and Rhythm: Normal rate and regular rhythm.   Pulmonary:      Effort: Pulmonary effort is normal.      Breath sounds: Normal breath sounds.   Musculoskeletal:         General: Normal range of motion.      Cervical back: Normal range of motion.   Skin:     General: Skin is warm and dry.   Neurological:      Mental Status: She is alert and oriented to person, place, and time.   Psychiatric:         Mood and Affect: Mood normal.         Behavior: Behavior normal.

## 2024-09-16 ENCOUNTER — PATIENT MESSAGE (OUTPATIENT)
Dept: OBGYN CLINIC | Facility: CLINIC | Age: 24
End: 2024-09-16

## 2024-11-08 ENCOUNTER — RA CDI HCC (OUTPATIENT)
Dept: OTHER | Facility: HOSPITAL | Age: 24
End: 2024-11-08

## 2024-11-08 NOTE — PROGRESS NOTES
HCC coding opportunities       Chart reviewed, no opportunity found: CHART REVIEWED, NO OPPORTUNITY FOUND        Patients Insurance        Commercial Insurance: [employer]

## 2024-11-12 DIAGNOSIS — F41.9 ANXIETY: ICD-10-CM

## 2024-11-13 RX ORDER — DULOXETIN HYDROCHLORIDE 30 MG/1
30 CAPSULE, DELAYED RELEASE ORAL DAILY
Qty: 90 CAPSULE | Refills: 1 | Status: SHIPPED | OUTPATIENT
Start: 2024-11-13

## 2024-11-15 ENCOUNTER — OFFICE VISIT (OUTPATIENT)
Dept: FAMILY MEDICINE CLINIC | Facility: CLINIC | Age: 24
End: 2024-11-15
Payer: COMMERCIAL

## 2024-11-15 VITALS
DIASTOLIC BLOOD PRESSURE: 90 MMHG | TEMPERATURE: 99.4 F | WEIGHT: 175.8 LBS | BODY MASS INDEX: 34.51 KG/M2 | OXYGEN SATURATION: 99 % | HEIGHT: 60 IN | HEART RATE: 123 BPM | RESPIRATION RATE: 16 BRPM | SYSTOLIC BLOOD PRESSURE: 124 MMHG

## 2024-11-15 DIAGNOSIS — F41.9 ANXIETY: ICD-10-CM

## 2024-11-15 DIAGNOSIS — R21 RASH: Primary | ICD-10-CM

## 2024-11-15 DIAGNOSIS — Z00.00 HEALTHCARE MAINTENANCE: ICD-10-CM

## 2024-11-15 DIAGNOSIS — J45.20 MILD INTERMITTENT ASTHMA WITHOUT COMPLICATION: ICD-10-CM

## 2024-11-15 PROCEDURE — 99395 PREV VISIT EST AGE 18-39: CPT | Performed by: NURSE PRACTITIONER

## 2024-11-15 PROCEDURE — 99214 OFFICE O/P EST MOD 30 MIN: CPT | Performed by: NURSE PRACTITIONER

## 2024-11-15 RX ORDER — TRIAMCINOLONE ACETONIDE 5 MG/G
CREAM TOPICAL 3 TIMES DAILY
Qty: 15 G | Refills: 0 | Status: SHIPPED | OUTPATIENT
Start: 2024-11-15

## 2024-11-15 RX ORDER — METHYLPREDNISOLONE 4 MG/1
TABLET ORAL
Qty: 21 EACH | Refills: 0 | Status: SHIPPED | OUTPATIENT
Start: 2024-11-15

## 2024-11-15 NOTE — PROGRESS NOTES
Name: Nikia Del Valle      : 2000      MRN: [id number]  Encounter Provider: ZOEY Alvarez  Encounter Date: 11/15/2024   Encounter department: [facility]    Assessment & Plan  Rash  - Prescription sent for medrol dose pack and triamcinolone cream.   - Contact office if no improvement.   Orders:    methylPREDNISolone 4 MG tablet therapy pack; Use as directed on package    triamcinolone (KENALOG) 0.5 % cream; Apply topically 3 (three) times a day    Anxiety  - Well controlled on Cymbalta 30 mg daily. Continue same.   - Will continue to monitor.         Mild intermittent asthma without complication  - Well controlled.  - Continue Zyrtec and Flonase.  - Albuterol inhaler as needed.  - Will continue to monitor.           Healthcare maintenance  - Reviewed immunizations and screenings.   - UTD with dental, vision, and GYN exams.   - Routine labs ordered.   Orders:    CBC and differential; Future    Comprehensive metabolic panel; Future    Lipid Panel with Direct LDL reflex; Future    TSH, 3rd generation with Free T4 reflex; Future         History of Present Illness     Patient presents to office today with complaints of rash. States the rash comes and goes. Has been occurring for the past month. Is present to her abdomen and bilateral arms and legs. Rash is itchy. She denies use of any new creams, lotions, soaps, or detergents. She did start a  new birth control pill but that was over 3 months ago. She denies any other concerns or complaints today.      Rash  This is a new problem. The current episode started 1 to 4 weeks ago. The problem has been waxing and waning since onset. The affected locations include the chest, torso, left arm, left upper leg, left leg, right arm and right leg. The rash is characterized by dryness, pain, redness, bruising and itchiness. It is unknown if there was an exposure to a precipitant. Pertinent negatives include no anorexia, congestion, cough,  diarrhea, facial edema, fatigue, fever, joint pain, rhinorrhea, shortness of breath, sore throat or vomiting.     Review of Systems   Constitutional:  Negative for fatigue and fever.   HENT:  Negative for congestion, rhinorrhea, sore throat and trouble swallowing.    Eyes:  Negative for pain and visual disturbance.   Respiratory:  Negative for cough and shortness of breath.    Cardiovascular:  Negative for chest pain and palpitations.   Gastrointestinal:  Negative for abdominal pain, anorexia, blood in stool, diarrhea and vomiting.   Endocrine: Negative for cold intolerance and heat intolerance.   Genitourinary:  Negative for difficulty urinating and dysuria.   Musculoskeletal:  Negative for gait problem and joint pain.   Skin:  Positive for rash.   Neurological:  Negative for dizziness, syncope and headaches.   Hematological:  Negative for adenopathy.   Psychiatric/Behavioral:  Negative for behavioral problems.      Past Medical History:   Diagnosis Date    Allergic rhinitis     Asthma     Environmental allergies     GERD (gastroesophageal reflux disease) 2001    Lyme disease     Mono exposure     Reflux esophagitis      Past Surgical History:   Procedure Laterality Date    ADENOIDECTOMY      LASIK  01/2021    NASAL SINUS SURGERY      TONSILLECTOMY  2001    UPPER GASTROINTESTINAL ENDOSCOPY       Family History   Problem Relation Age of Onset    Hyperthyroidism Mother     Clotting disorder Mother     Diabetes Father     No Known Problems Sister     Hyperthyroidism Maternal Grandmother     No Known Problems Maternal Grandfather     No Known Problems Paternal Grandmother     No Known Problems Paternal Grandfather     Breast cancer Neg Hx      Social History     Tobacco Use    Smoking status: Never    Smokeless tobacco: Never   Vaping Use    Vaping status: Never Used   Substance and Sexual Activity    Alcohol use: Yes     Comment: socially     Drug use: Never    Sexual activity: Yes     Partners: Male     Birth  control/protection: Condom Male, I.U.D.     Current Outpatient Medications on File Prior to Visit   Medication Sig    albuterol (Ventolin HFA) 90 mcg/act inhaler Inhale 2 puffs every 4 (four) hours as needed for wheezing    DULoxetine (CYMBALTA) 30 mg delayed release capsule take 1 capsule by mouth daily    fluticasone (FLONASE) 50 mcg/act nasal spray 1 spray into each nostril daily    norethindrone-ethinyl estradiol (Junel FE 1/20) 1-20 MG-MCG per tablet Take 1 tablet by mouth daily    amphetamine-dextroamphetamine (ADDERALL) 15 MG tablet Take 15 mg by mouth daily  (Patient not taking: Reported on 3/5/2024)    cetirizine (ZyrTEC) 10 mg tablet Take 10 mg by mouth as needed for allergies (Patient not taking: Reported on 3/5/2024)    EPINEPHrine (EPIPEN) 0.3 mg/0.3 mL SOAJ Inject 0.3 mL (0.3 mg total) into a muscle once for 1 dose (Patient taking differently: Inject 0.3 mg into a muscle once As needed)     Allergies   Allergen Reactions    Clindamycin/Lincomycin Hives     hives and flushed    Latex      Immunization History   Administered Date(s) Administered    HPV9 07/09/2021, 09/10/2021     Objective   /90 (BP Location: Left arm, Patient Position: Sitting, Cuff Size: Standard)   Pulse (!) 123   Temp 99.4 °F (37.4 °C) (Tympanic)   Resp 16   Ht 5' (1.524 m)   Wt 79.7 kg (175 lb 12.8 oz)   SpO2 99%   BMI 34.33 kg/m²     Physical Exam  Vitals and nursing note reviewed.   Constitutional:       General: She is not in acute distress.     Appearance: Normal appearance. She is well-developed. She is not ill-appearing.   HENT:      Head: Normocephalic and atraumatic.      Right Ear: Tympanic membrane, ear canal and external ear normal.      Left Ear: Tympanic membrane, ear canal and external ear normal.      Nose: Nose normal.      Mouth/Throat:      Mouth: Mucous membranes are moist.      Pharynx: No posterior oropharyngeal erythema.   Eyes:      Conjunctiva/sclera: Conjunctivae normal.      Pupils: Pupils are  equal, round, and reactive to light.   Cardiovascular:      Rate and Rhythm: Normal rate and regular rhythm.      Heart sounds: Normal heart sounds.   Pulmonary:      Effort: Pulmonary effort is normal.      Breath sounds: Normal breath sounds.   Abdominal:      General: Bowel sounds are normal.      Palpations: Abdomen is soft.   Musculoskeletal:         General: Normal range of motion.      Cervical back: Normal range of motion.   Lymphadenopathy:      Cervical: No cervical adenopathy.   Skin:     General: Skin is warm and dry.      Findings: Rash present.   Neurological:      Mental Status: She is alert and oriented to person, place, and time.   Psychiatric:         Mood and Affect: Mood normal.         Behavior: Behavior normal.         Answers submitted by the patient for this visit:  Rash Questionnaire (Submitted on 11/14/2024)  Chief Complaint: Rash  nail changes: No

## 2024-11-15 NOTE — ASSESSMENT & PLAN NOTE
- Prescription sent for medrol dose pack and triamcinolone cream.   - Contact office if no improvement.   Orders:    methylPREDNISolone 4 MG tablet therapy pack; Use as directed on package    triamcinolone (KENALOG) 0.5 % cream; Apply topically 3 (three) times a day

## 2024-11-15 NOTE — ASSESSMENT & PLAN NOTE
- Reviewed immunizations and screenings.   - UTD with dental, vision, and GYN exams.   - Routine labs ordered.   Orders:    CBC and differential; Future    Comprehensive metabolic panel; Future    Lipid Panel with Direct LDL reflex; Future    TSH, 3rd generation with Free T4 reflex; Future

## 2024-11-25 ENCOUNTER — OFFICE VISIT (OUTPATIENT)
Dept: URGENT CARE | Age: 24
End: 2024-11-25
Payer: COMMERCIAL

## 2024-11-25 VITALS
TEMPERATURE: 98.5 F | DIASTOLIC BLOOD PRESSURE: 72 MMHG | OXYGEN SATURATION: 98 % | SYSTOLIC BLOOD PRESSURE: 121 MMHG | RESPIRATION RATE: 18 BRPM | HEART RATE: 93 BPM

## 2024-11-25 DIAGNOSIS — R21 RASH: Primary | ICD-10-CM

## 2024-11-25 PROCEDURE — G0382 LEV 3 HOSP TYPE B ED VISIT: HCPCS | Performed by: EMERGENCY MEDICINE

## 2024-11-25 PROCEDURE — S9083 URGENT CARE CENTER GLOBAL: HCPCS | Performed by: EMERGENCY MEDICINE

## 2024-11-25 RX ORDER — PREDNISONE 10 MG/1
TABLET ORAL
Qty: 54 TABLET | Refills: 0 | Status: SHIPPED | OUTPATIENT
Start: 2024-11-25 | End: 2024-12-09

## 2024-11-25 RX ORDER — HYDROXYZINE HYDROCHLORIDE 25 MG/1
25 TABLET, FILM COATED ORAL EVERY 6 HOURS PRN
Qty: 120 TABLET | Refills: 1 | Status: SHIPPED | OUTPATIENT
Start: 2024-11-25 | End: 2024-12-25

## 2024-11-29 NOTE — PROGRESS NOTES
[facility]        NAME: Nikia Del Valle is a 24 y.o. female  : 2000    MRN: [id number]  DATE: 2024  TIME: 3:56 PM    Assessment and Plan   Rash [R21]  1. Rash  predniSONE 10 mg tablet    hydrOXYzine HCL (ATARAX) 25 mg tablet            Patient Instructions       Follow up with PCP in 3-5 days.  Proceed to  ER if symptoms worsen.    If tests have been performed at [facility], our office will contact you with results if changes need to be made to the care plan discussed with you at the visit.  [facility]    Chief Complaint     Chief Complaint   Patient presents with   • Rash     Patient c/o a rash all over her body x 2 months. She noted being seen by PCP and completing steroids with no improvement.           History of Present Illness       Rash  This is a new problem. The current episode started 1 to 4 weeks ago. The problem is unchanged. The rash is diffuse. The problem is mild. Associated symptoms include itching. Pertinent negatives include no anorexia, congestion, cough, decreased physical activity, decreased responsiveness, decreased sleep, drinking less, diarrhea, facial edema, fatigue, fever, joint pain, rhinorrhea, shortness of breath, sore throat or vomiting. Past treatments include nothing.       Review of Systems   Review of Systems   Constitutional:  Negative for activity change, appetite change, chills, decreased responsiveness, diaphoresis, fatigue, fever and unexpected weight change.   HENT:  Negative for congestion, dental problem, drooling, ear discharge, ear pain, facial swelling, hearing loss, mouth sores, nosebleeds, postnasal drip, rhinorrhea, sinus pressure, sinus pain, sneezing, sore throat, tinnitus, trouble swallowing and voice change.    Eyes:  Negative for pain and visual disturbance.   Respiratory:  Negative for cough and shortness of breath.    Cardiovascular:  Negative for chest pain and palpitations.    Gastrointestinal:  Negative for abdominal pain, anorexia, diarrhea and vomiting.   Genitourinary:  Negative for dysuria and hematuria.   Musculoskeletal:  Negative for arthralgias, back pain, gait problem, joint pain, joint swelling, myalgias, neck pain and neck stiffness.   Skin:  Positive for itching and rash. Negative for color change, pallor and wound.   Neurological:  Negative for dizziness, tremors, seizures, syncope, facial asymmetry, speech difficulty, weakness, light-headedness, numbness and headaches.   All other systems reviewed and are negative.        Current Medications       Current Outpatient Medications:   •  hydrOXYzine HCL (ATARAX) 25 mg tablet, Take 1 tablet (25 mg total) by mouth every 6 (six) hours as needed for itching or allergies, Disp: 120 tablet, Rfl: 1  •  predniSONE 10 mg tablet, Take 6 tablets (60 mg total) by mouth daily for 4 days, THEN 5 tablets (50 mg total) daily for 2 days, THEN 4 tablets (40 mg total) daily for 2 days, THEN 3 tablets (30 mg total) daily for 2 days, THEN 2 tablets (20 mg total) daily for 2 days, THEN 1 tablet (10 mg total) daily for 2 days., Disp: 54 tablet, Rfl: 0  •  albuterol (Ventolin HFA) 90 mcg/act inhaler, Inhale 2 puffs every 4 (four) hours as needed for wheezing, Disp: 18 g, Rfl: 3  •  amphetamine-dextroamphetamine (ADDERALL) 15 MG tablet, Take 15 mg by mouth daily  (Patient not taking: Reported on 3/5/2024), Disp: , Rfl:   •  cetirizine (ZyrTEC) 10 mg tablet, Take 10 mg by mouth as needed for allergies (Patient not taking: Reported on 3/5/2024), Disp: , Rfl:   •  DULoxetine (CYMBALTA) 30 mg delayed release capsule, take 1 capsule by mouth daily, Disp: 90 capsule, Rfl: 1  •  EPINEPHrine (EPIPEN) 0.3 mg/0.3 mL SOAJ, Inject 0.3 mL (0.3 mg total) into a muscle once for 1 dose (Patient taking differently: Inject 0.3 mg into a muscle once As needed), Disp: 2 each, Rfl: 3  •  fluticasone (FLONASE) 50 mcg/act nasal spray, 1 spray into each nostril daily, Disp:  16 g, Rfl: 0  •  methylPREDNISolone 4 MG tablet therapy pack, Use as directed on package, Disp: 21 each, Rfl: 0  •  norethindrone-ethinyl estradiol (Junel FE 1/20) 1-20 MG-MCG per tablet, Take 1 tablet by mouth daily, Disp: 84 tablet, Rfl: 3  •  triamcinolone (KENALOG) 0.5 % cream, Apply topically 3 (three) times a day, Disp: 15 g, Rfl: 0    Current Allergies     Allergies as of 11/25/2024 - Reviewed 11/25/2024   Allergen Reaction Noted   • Clindamycin/lincomycin Hives 04/16/2002   • Latex  11/04/2019            The following portions of the patient's history were reviewed and updated as appropriate: allergies, current medications, past family history, past medical history, past social history, past surgical history and problem list.     Past Medical History:   Diagnosis Date   • Allergic rhinitis    • Asthma    • Environmental allergies    • GERD (gastroesophageal reflux disease) 2001   • Lyme disease    • Mono exposure    • Reflux esophagitis        Past Surgical History:   Procedure Laterality Date   • ADENOIDECTOMY     • LASIK  01/2021   • NASAL SINUS SURGERY     • TONSILLECTOMY  2001   • UPPER GASTROINTESTINAL ENDOSCOPY         Family History   Problem Relation Age of Onset   • Hyperthyroidism Mother    • Clotting disorder Mother    • Diabetes Father    • No Known Problems Sister    • Hyperthyroidism Maternal Grandmother    • No Known Problems Maternal Grandfather    • No Known Problems Paternal Grandmother    • No Known Problems Paternal Grandfather    • Breast cancer Neg Hx          Medications have been verified.        Objective   /72   Pulse 93   Temp 98.5 °F (36.9 °C)   Resp 18   SpO2 98%   No LMP recorded. (Menstrual status: Birth Control).       Physical Exam     Physical Exam  Vitals and nursing note reviewed.   Constitutional:       General: She is not in acute distress.     Appearance: Normal appearance. She is not ill-appearing, toxic-appearing or diaphoretic.   HENT:      Head:  Normocephalic and atraumatic.      Right Ear: External ear normal.      Left Ear: External ear normal.      Nose: Nose normal.      Mouth/Throat:      Mouth: Mucous membranes are moist.      Pharynx: No oropharyngeal exudate or posterior oropharyngeal erythema.      Comments: No oropharyngeal edema or swelling noted  Eyes:      General: No scleral icterus.        Right eye: No discharge.         Left eye: No discharge.      Extraocular Movements: Extraocular movements intact.      Pupils: Pupils are equal, round, and reactive to light.   Cardiovascular:      Rate and Rhythm: Normal rate and regular rhythm.      Pulses: Normal pulses.           Carotid pulses are 2+ on the right side and 2+ on the left side.       Radial pulses are 2+ on the right side and 2+ on the left side.      Heart sounds: No murmur heard.     No friction rub. No gallop.   Pulmonary:      Effort: Pulmonary effort is normal. No respiratory distress.      Breath sounds: Normal breath sounds. No stridor. No wheezing, rhonchi or rales.   Chest:      Chest wall: No tenderness.   Abdominal:      General: Abdomen is flat. There is no distension.      Palpations: Abdomen is soft. There is no mass.      Tenderness: There is no abdominal tenderness. There is no right CVA tenderness, left CVA tenderness, guarding or rebound.      Hernia: No hernia is present.   Musculoskeletal:         General: No swelling, tenderness, deformity or signs of injury.      Cervical back: Normal range of motion and neck supple.      Right lower leg: No edema.      Left lower leg: No edema.   Skin:     General: Skin is warm and dry.      Capillary Refill: Capillary refill takes less than 2 seconds.      Coloration: Skin is not jaundiced or pale.      Findings: Rash present. No bruising or erythema.          Neurological:      General: No focal deficit present.      Mental Status: She is alert and oriented to person, place, and time.      Cranial Nerves: No cranial nerve  deficit.      Sensory: No sensory deficit.      Motor: No weakness.      Coordination: Coordination normal.      Gait: Gait normal.   Psychiatric:         Mood and Affect: Mood normal.         Behavior: Behavior normal.

## 2024-12-15 PROBLEM — Z00.00 HEALTHCARE MAINTENANCE: Status: RESOLVED | Noted: 2024-11-15 | Resolved: 2024-12-15

## 2025-01-18 ENCOUNTER — APPOINTMENT (OUTPATIENT)
Dept: LAB | Age: 25
End: 2025-01-18
Payer: COMMERCIAL

## 2025-01-18 DIAGNOSIS — Z00.00 HEALTHCARE MAINTENANCE: ICD-10-CM

## 2025-01-18 LAB
ALBUMIN SERPL BCG-MCNC: 4.8 G/DL (ref 3.5–5)
ALP SERPL-CCNC: 57 U/L (ref 34–104)
ALT SERPL W P-5'-P-CCNC: 110 U/L (ref 7–52)
ANION GAP SERPL CALCULATED.3IONS-SCNC: 11 MMOL/L (ref 4–13)
AST SERPL W P-5'-P-CCNC: 52 U/L (ref 13–39)
BASOPHILS # BLD AUTO: 0.1 THOUSANDS/ΜL (ref 0–0.1)
BASOPHILS NFR BLD AUTO: 2 % (ref 0–1)
BILIRUB SERPL-MCNC: 0.32 MG/DL (ref 0.2–1)
BUN SERPL-MCNC: 13 MG/DL (ref 5–25)
CALCIUM SERPL-MCNC: 10.2 MG/DL (ref 8.4–10.2)
CHLORIDE SERPL-SCNC: 98 MMOL/L (ref 96–108)
CHOLEST SERPL-MCNC: 256 MG/DL (ref ?–200)
CO2 SERPL-SCNC: 27 MMOL/L (ref 21–32)
CREAT SERPL-MCNC: 0.63 MG/DL (ref 0.6–1.3)
EOSINOPHIL # BLD AUTO: 0.27 THOUSAND/ΜL (ref 0–0.61)
EOSINOPHIL NFR BLD AUTO: 4 % (ref 0–6)
ERYTHROCYTE [DISTWIDTH] IN BLOOD BY AUTOMATED COUNT: 11.9 % (ref 11.6–15.1)
GFR SERPL CREATININE-BSD FRML MDRD: 124 ML/MIN/1.73SQ M
GLUCOSE P FAST SERPL-MCNC: 84 MG/DL (ref 65–99)
HCT VFR BLD AUTO: 42.9 % (ref 34.8–46.1)
HDLC SERPL-MCNC: 52 MG/DL
HGB BLD-MCNC: 14.3 G/DL (ref 11.5–15.4)
IMM GRANULOCYTES # BLD AUTO: 0.02 THOUSAND/UL (ref 0–0.2)
IMM GRANULOCYTES NFR BLD AUTO: 0 % (ref 0–2)
LDLC SERPL CALC-MCNC: 158 MG/DL (ref 0–100)
LYMPHOCYTES # BLD AUTO: 2.33 THOUSANDS/ΜL (ref 0.6–4.47)
LYMPHOCYTES NFR BLD AUTO: 36 % (ref 14–44)
MCH RBC QN AUTO: 30.8 PG (ref 26.8–34.3)
MCHC RBC AUTO-ENTMCNC: 33.3 G/DL (ref 31.4–37.4)
MCV RBC AUTO: 93 FL (ref 82–98)
MONOCYTES # BLD AUTO: 0.7 THOUSAND/ΜL (ref 0.17–1.22)
MONOCYTES NFR BLD AUTO: 11 % (ref 4–12)
NEUTROPHILS # BLD AUTO: 3 THOUSANDS/ΜL (ref 1.85–7.62)
NEUTS SEG NFR BLD AUTO: 47 % (ref 43–75)
NRBC BLD AUTO-RTO: 0 /100 WBCS
PLATELET # BLD AUTO: 332 THOUSANDS/UL (ref 149–390)
PMV BLD AUTO: 11.2 FL (ref 8.9–12.7)
POTASSIUM SERPL-SCNC: 4.3 MMOL/L (ref 3.5–5.3)
PROT SERPL-MCNC: 7.7 G/DL (ref 6.4–8.4)
RBC # BLD AUTO: 4.64 MILLION/UL (ref 3.81–5.12)
SODIUM SERPL-SCNC: 136 MMOL/L (ref 135–147)
TRIGL SERPL-MCNC: 231 MG/DL (ref ?–150)
TSH SERPL DL<=0.05 MIU/L-ACNC: 2.21 UIU/ML (ref 0.45–4.5)
WBC # BLD AUTO: 6.42 THOUSAND/UL (ref 4.31–10.16)

## 2025-01-18 PROCEDURE — 36415 COLL VENOUS BLD VENIPUNCTURE: CPT

## 2025-01-18 PROCEDURE — 84443 ASSAY THYROID STIM HORMONE: CPT

## 2025-01-18 PROCEDURE — 80061 LIPID PANEL: CPT

## 2025-01-18 PROCEDURE — 85025 COMPLETE CBC W/AUTO DIFF WBC: CPT

## 2025-01-18 PROCEDURE — 80053 COMPREHEN METABOLIC PANEL: CPT

## 2025-01-22 ENCOUNTER — RESULTS FOLLOW-UP (OUTPATIENT)
Dept: FAMILY MEDICINE CLINIC | Facility: CLINIC | Age: 25
End: 2025-01-22

## 2025-01-22 DIAGNOSIS — R79.89 ELEVATED LFTS: Primary | ICD-10-CM

## 2025-01-29 DIAGNOSIS — R21 RASH: ICD-10-CM

## 2025-03-03 ENCOUNTER — TELEPHONE (OUTPATIENT)
Dept: GASTROENTEROLOGY | Facility: MEDICAL CENTER | Age: 25
End: 2025-03-03

## 2025-03-30 RX ORDER — HYDROXYZINE HYDROCHLORIDE 25 MG/1
TABLET, FILM COATED ORAL
Qty: 120 TABLET | Refills: 1 | OUTPATIENT
Start: 2025-03-30

## 2025-05-05 DIAGNOSIS — F41.9 ANXIETY: ICD-10-CM

## 2025-05-06 RX ORDER — DULOXETIN HYDROCHLORIDE 30 MG/1
30 CAPSULE, DELAYED RELEASE ORAL DAILY
Qty: 90 CAPSULE | Refills: 1 | Status: SHIPPED | OUTPATIENT
Start: 2025-05-06

## 2025-05-14 ENCOUNTER — TELEPHONE (OUTPATIENT)
Dept: FAMILY MEDICINE CLINIC | Facility: CLINIC | Age: 25
End: 2025-05-14

## 2025-05-22 ENCOUNTER — TELEPHONE (OUTPATIENT)
Dept: ADMINISTRATIVE | Facility: OTHER | Age: 25
End: 2025-05-22

## 2025-05-23 NOTE — TELEPHONE ENCOUNTER
05/23/25 12:11 PM        The office's request has been received, reviewed, and the patient chart updated. The PCP has successfully been removed with a patient attribution note. This message will now be completed.        Thank you  Jose Guerrero

## 2025-05-27 NOTE — TELEPHONE ENCOUNTER
05/27/25 2:22 PM    Patient contacted to bring Advance Directive, POLST, or Living Will document to next scheduled pcp visit.[facility] Department left message.    Thank you.  Zenobia Graham MA  [facility]